# Patient Record
Sex: FEMALE | Race: BLACK OR AFRICAN AMERICAN | Employment: FULL TIME | URBAN - METROPOLITAN AREA
[De-identification: names, ages, dates, MRNs, and addresses within clinical notes are randomized per-mention and may not be internally consistent; named-entity substitution may affect disease eponyms.]

---

## 2018-04-03 ENCOUNTER — HOSPITAL ENCOUNTER (OUTPATIENT)
Age: 23
Discharge: HOME | End: 2018-04-03
Attending: LEGAL MEDICINE | Admitting: LEGAL MEDICINE
Payer: COMMERCIAL

## 2018-04-03 ENCOUNTER — APPOINTMENT (OUTPATIENT)
Dept: LABOR AND DELIVERY | Age: 23
End: 2018-04-03
Payer: COMMERCIAL

## 2018-04-03 VITALS
DIASTOLIC BLOOD PRESSURE: 70 MMHG | RESPIRATION RATE: 16 BRPM | HEART RATE: 107 BPM | SYSTOLIC BLOOD PRESSURE: 93 MMHG | TEMPERATURE: 98.7 F

## 2018-04-03 PROCEDURE — 59025 FETAL NON-STRESS TEST: CPT

## 2018-04-05 ENCOUNTER — APPOINTMENT (OUTPATIENT)
Dept: LABOR AND DELIVERY | Age: 23
End: 2018-04-05
Payer: COMMERCIAL

## 2018-04-05 ENCOUNTER — HOSPITAL ENCOUNTER (OUTPATIENT)
Age: 23
Discharge: HOME | End: 2018-04-05
Attending: LEGAL MEDICINE | Admitting: LEGAL MEDICINE
Payer: COMMERCIAL

## 2018-04-05 VITALS — HEART RATE: 100 BPM | SYSTOLIC BLOOD PRESSURE: 113 MMHG | RESPIRATION RATE: 16 BRPM | DIASTOLIC BLOOD PRESSURE: 63 MMHG

## 2018-04-05 PROCEDURE — 59025 FETAL NON-STRESS TEST: CPT

## 2018-04-11 ENCOUNTER — HOSPITAL ENCOUNTER (INPATIENT)
Age: 23
LOS: 2 days | Discharge: HOME | DRG: 560 | End: 2018-04-13
Attending: LEGAL MEDICINE | Admitting: LEGAL MEDICINE
Payer: COMMERCIAL

## 2018-04-11 ENCOUNTER — APPOINTMENT (OUTPATIENT)
Dept: LABOR AND DELIVERY | Age: 23
DRG: 560 | End: 2018-04-11
Payer: COMMERCIAL

## 2018-04-11 LAB
ABO/RH: NORMAL
AMPHETAMINE SCREEN, URINE: NOT DETECTED
ANTIBODY SCREEN: NORMAL
BARBITURATE SCREEN URINE: NOT DETECTED
BENZODIAZEPINE SCREEN, URINE: NOT DETECTED
CANNABINOID SCREEN URINE: NOT DETECTED
COCAINE METABOLITE SCREEN URINE: NOT DETECTED
HCT VFR BLD CALC: 32.5 % (ref 34–48)
HEMOGLOBIN: 10.8 G/DL (ref 11.5–15.5)
MCH RBC QN AUTO: 28.4 PG (ref 26–35)
MCHC RBC AUTO-ENTMCNC: 33.2 % (ref 32–34.5)
MCV RBC AUTO: 85.5 FL (ref 80–99.9)
METHADONE SCREEN, URINE: NOT DETECTED
OPIATE SCREEN URINE: NOT DETECTED
PDW BLD-RTO: 13.2 FL (ref 11.5–15)
PHENCYCLIDINE SCREEN URINE: NOT DETECTED
PLATELET # BLD: 314 E9/L (ref 130–450)
PMV BLD AUTO: 10.3 FL (ref 7–12)
PROPOXYPHENE SCREEN: NOT DETECTED
RBC # BLD: 3.8 E12/L (ref 3.5–5.5)
WBC # BLD: 8.4 E9/L (ref 4.5–11.5)

## 2018-04-11 PROCEDURE — 80307 DRUG TEST PRSMV CHEM ANLYZR: CPT

## 2018-04-11 PROCEDURE — 36415 COLL VENOUS BLD VENIPUNCTURE: CPT

## 2018-04-11 PROCEDURE — 6360000002 HC RX W HCPCS: Performed by: LEGAL MEDICINE

## 2018-04-11 PROCEDURE — 86901 BLOOD TYPING SEROLOGIC RH(D): CPT

## 2018-04-11 PROCEDURE — 2580000003 HC RX 258: Performed by: LEGAL MEDICINE

## 2018-04-11 PROCEDURE — 86900 BLOOD TYPING SEROLOGIC ABO: CPT

## 2018-04-11 PROCEDURE — 7200000001 HC VAGINAL DELIVERY

## 2018-04-11 PROCEDURE — 86850 RBC ANTIBODY SCREEN: CPT

## 2018-04-11 PROCEDURE — 1220000001 HC SEMI PRIVATE L&D R&B

## 2018-04-11 PROCEDURE — 85027 COMPLETE CBC AUTOMATED: CPT

## 2018-04-11 PROCEDURE — 88307 TISSUE EXAM BY PATHOLOGIST: CPT

## 2018-04-11 RX ORDER — LANOLIN 100 %
OINTMENT (GRAM) TOPICAL PRN
Status: DISCONTINUED | OUTPATIENT
Start: 2018-04-11 | End: 2018-04-13 | Stop reason: HOSPADM

## 2018-04-11 RX ORDER — DOCUSATE SODIUM 100 MG/1
100 CAPSULE, LIQUID FILLED ORAL 2 TIMES DAILY
Status: DISCONTINUED | OUTPATIENT
Start: 2018-04-12 | End: 2018-04-13 | Stop reason: HOSPADM

## 2018-04-11 RX ORDER — SODIUM CHLORIDE 0.9 % (FLUSH) 0.9 %
10 SYRINGE (ML) INJECTION PRN
Status: DISCONTINUED | OUTPATIENT
Start: 2018-04-11 | End: 2018-04-11

## 2018-04-11 RX ORDER — SODIUM CHLORIDE 0.9 % (FLUSH) 0.9 %
10 SYRINGE (ML) INJECTION EVERY 12 HOURS SCHEDULED
Status: DISCONTINUED | OUTPATIENT
Start: 2018-04-11 | End: 2018-04-11

## 2018-04-11 RX ORDER — CARBOPROST TROMETHAMINE 250 UG/ML
250 INJECTION, SOLUTION INTRAMUSCULAR
Status: ACTIVE | OUTPATIENT
Start: 2018-04-11 | End: 2018-04-11

## 2018-04-11 RX ORDER — METHYLERGONOVINE MALEATE 0.2 MG/ML
200 INJECTION INTRAVENOUS
Status: ACTIVE | OUTPATIENT
Start: 2018-04-11 | End: 2018-04-11

## 2018-04-11 RX ORDER — OXYCODONE HYDROCHLORIDE AND ACETAMINOPHEN 5; 325 MG/1; MG/1
1 TABLET ORAL EVERY 4 HOURS PRN
Status: DISCONTINUED | OUTPATIENT
Start: 2018-04-11 | End: 2018-04-13 | Stop reason: HOSPADM

## 2018-04-11 RX ORDER — SODIUM CHLORIDE 0.9 % (FLUSH) 0.9 %
10 SYRINGE (ML) INJECTION PRN
Status: DISCONTINUED | OUTPATIENT
Start: 2018-04-11 | End: 2018-04-13 | Stop reason: HOSPADM

## 2018-04-11 RX ORDER — EPHEDRINE SULFATE/0.9% NACL/PF 50 MG/5 ML
5 SYRINGE (ML) INTRAVENOUS PRN
Status: DISCONTINUED | OUTPATIENT
Start: 2018-04-11 | End: 2018-04-11

## 2018-04-11 RX ORDER — SODIUM CHLORIDE 0.9 % (FLUSH) 0.9 %
10 SYRINGE (ML) INJECTION EVERY 12 HOURS SCHEDULED
Status: DISCONTINUED | OUTPATIENT
Start: 2018-04-12 | End: 2018-04-13 | Stop reason: HOSPADM

## 2018-04-11 RX ORDER — SODIUM CHLORIDE, SODIUM LACTATE, POTASSIUM CHLORIDE, CALCIUM CHLORIDE 600; 310; 30; 20 MG/100ML; MG/100ML; MG/100ML; MG/100ML
INJECTION, SOLUTION INTRAVENOUS CONTINUOUS
Status: DISCONTINUED | OUTPATIENT
Start: 2018-04-11 | End: 2018-04-11

## 2018-04-11 RX ORDER — DEXTROSE, SODIUM CHLORIDE, SODIUM LACTATE, POTASSIUM CHLORIDE, AND CALCIUM CHLORIDE 5; .6; .31; .03; .02 G/100ML; G/100ML; G/100ML; G/100ML; G/100ML
INJECTION, SOLUTION INTRAVENOUS CONTINUOUS
Status: DISCONTINUED | OUTPATIENT
Start: 2018-04-11 | End: 2018-04-11

## 2018-04-11 RX ORDER — SODIUM CHLORIDE, SODIUM LACTATE, POTASSIUM CHLORIDE, CALCIUM CHLORIDE 600; 310; 30; 20 MG/100ML; MG/100ML; MG/100ML; MG/100ML
INJECTION, SOLUTION INTRAVENOUS CONTINUOUS
Status: DISCONTINUED | OUTPATIENT
Start: 2018-04-12 | End: 2018-04-13 | Stop reason: HOSPADM

## 2018-04-11 RX ORDER — OXYCODONE HYDROCHLORIDE AND ACETAMINOPHEN 5; 325 MG/1; MG/1
2 TABLET ORAL EVERY 4 HOURS PRN
Status: DISCONTINUED | OUTPATIENT
Start: 2018-04-11 | End: 2018-04-13 | Stop reason: HOSPADM

## 2018-04-11 RX ORDER — FERROUS SULFATE 325(65) MG
325 TABLET ORAL
Status: DISCONTINUED | OUTPATIENT
Start: 2018-04-12 | End: 2018-04-13 | Stop reason: HOSPADM

## 2018-04-11 RX ORDER — ONDANSETRON 2 MG/ML
4 INJECTION INTRAMUSCULAR; INTRAVENOUS EVERY 6 HOURS PRN
Status: DISCONTINUED | OUTPATIENT
Start: 2018-04-11 | End: 2018-04-11

## 2018-04-11 RX ORDER — LIDOCAINE HYDROCHLORIDE 10 MG/ML
30 INJECTION, SOLUTION EPIDURAL; INFILTRATION; INTRACAUDAL; PERINEURAL PRN
Status: DISCONTINUED | OUTPATIENT
Start: 2018-04-11 | End: 2018-04-11

## 2018-04-11 RX ORDER — ACETAMINOPHEN 325 MG/1
650 TABLET ORAL EVERY 4 HOURS PRN
Status: DISCONTINUED | OUTPATIENT
Start: 2018-04-11 | End: 2018-04-13 | Stop reason: HOSPADM

## 2018-04-11 RX ORDER — IBUPROFEN 600 MG/1
600 TABLET ORAL EVERY 8 HOURS PRN
Status: DISCONTINUED | OUTPATIENT
Start: 2018-04-11 | End: 2018-04-13 | Stop reason: HOSPADM

## 2018-04-11 RX ADMIN — Medication 2 MILLI-UNITS/MIN: at 17:00

## 2018-04-11 RX ADMIN — SODIUM CHLORIDE, POTASSIUM CHLORIDE, SODIUM LACTATE AND CALCIUM CHLORIDE 125 ML/HR: 600; 310; 30; 20 INJECTION, SOLUTION INTRAVENOUS at 16:00

## 2018-04-12 PROBLEM — Z37.9 NORMAL LABOR: Status: ACTIVE | Noted: 2018-04-12

## 2018-04-12 LAB — HEMOGLOBIN: 11.3 G/DL (ref 11.5–15.5)

## 2018-04-12 PROCEDURE — 85018 HEMOGLOBIN: CPT

## 2018-04-12 PROCEDURE — 36415 COLL VENOUS BLD VENIPUNCTURE: CPT

## 2018-04-12 PROCEDURE — 1220000001 HC SEMI PRIVATE L&D R&B

## 2018-04-12 PROCEDURE — 6370000000 HC RX 637 (ALT 250 FOR IP): Performed by: LEGAL MEDICINE

## 2018-04-12 RX ADMIN — OXYCODONE HYDROCHLORIDE AND ACETAMINOPHEN 2 TABLET: 5; 325 TABLET ORAL at 07:33

## 2018-04-12 RX ADMIN — OXYCODONE HYDROCHLORIDE AND ACETAMINOPHEN 2 TABLET: 5; 325 TABLET ORAL at 19:59

## 2018-04-12 RX ADMIN — OXYCODONE HYDROCHLORIDE AND ACETAMINOPHEN 2 TABLET: 5; 325 TABLET ORAL at 23:51

## 2018-04-12 RX ADMIN — DOCUSATE SODIUM 100 MG: 100 CAPSULE, LIQUID FILLED ORAL at 07:33

## 2018-04-12 RX ADMIN — OXYCODONE HYDROCHLORIDE AND ACETAMINOPHEN 2 TABLET: 5; 325 TABLET ORAL at 02:16

## 2018-04-12 RX ADMIN — OXYCODONE HYDROCHLORIDE AND ACETAMINOPHEN 2 TABLET: 5; 325 TABLET ORAL at 11:30

## 2018-04-12 RX ADMIN — OXYCODONE HYDROCHLORIDE AND ACETAMINOPHEN 2 TABLET: 5; 325 TABLET ORAL at 16:10

## 2018-04-12 RX ADMIN — IBUPROFEN 600 MG: 600 TABLET, FILM COATED ORAL at 00:07

## 2018-04-12 RX ADMIN — DOCUSATE SODIUM 100 MG: 100 CAPSULE, LIQUID FILLED ORAL at 20:07

## 2018-04-12 RX ADMIN — WITCH HAZEL 40 EACH: 500 SOLUTION RECTAL; TOPICAL at 07:34

## 2018-04-12 ASSESSMENT — PAIN DESCRIPTION - PAIN TYPE: TYPE: ACUTE PAIN

## 2018-04-12 ASSESSMENT — PAIN SCALES - GENERAL
PAINLEVEL_OUTOF10: 8
PAINLEVEL_OUTOF10: 8
PAINLEVEL_OUTOF10: 1
PAINLEVEL_OUTOF10: 9
PAINLEVEL_OUTOF10: 6
PAINLEVEL_OUTOF10: 7

## 2018-04-12 ASSESSMENT — PAIN DESCRIPTION - LOCATION: LOCATION: ABDOMEN;PERINEUM

## 2018-04-12 ASSESSMENT — PAIN DESCRIPTION - DESCRIPTORS: DESCRIPTORS: CRAMPING;DISCOMFORT

## 2018-04-13 ENCOUNTER — HOSPITAL ENCOUNTER (EMERGENCY)
Age: 23
Discharge: HOME | End: 2018-04-13
Attending: EMERGENCY MEDICINE
Payer: COMMERCIAL

## 2018-04-13 VITALS
RESPIRATION RATE: 16 BRPM | BODY MASS INDEX: 30.37 KG/M2 | HEIGHT: 66 IN | SYSTOLIC BLOOD PRESSURE: 113 MMHG | TEMPERATURE: 98.2 F | WEIGHT: 189 LBS | HEART RATE: 83 BPM | DIASTOLIC BLOOD PRESSURE: 56 MMHG

## 2018-04-13 VITALS
RESPIRATION RATE: 16 BRPM | OXYGEN SATURATION: 97 % | BODY MASS INDEX: 28.93 KG/M2 | HEART RATE: 99 BPM | DIASTOLIC BLOOD PRESSURE: 76 MMHG | SYSTOLIC BLOOD PRESSURE: 131 MMHG | WEIGHT: 180 LBS | HEIGHT: 66 IN | TEMPERATURE: 98.7 F

## 2018-04-13 DIAGNOSIS — Z71.1 NO PROBLEM, FEARED COMPLAINT UNFOUNDED: Primary | ICD-10-CM

## 2018-04-13 PROCEDURE — 99283 EMERGENCY DEPT VISIT LOW MDM: CPT

## 2018-04-13 PROCEDURE — 6370000000 HC RX 637 (ALT 250 FOR IP): Performed by: LEGAL MEDICINE

## 2018-04-13 RX ADMIN — OXYCODONE HYDROCHLORIDE AND ACETAMINOPHEN 2 TABLET: 5; 325 TABLET ORAL at 05:18

## 2018-04-13 RX ADMIN — IBUPROFEN 600 MG: 600 TABLET, FILM COATED ORAL at 05:54

## 2018-04-13 RX ADMIN — DOCUSATE SODIUM 100 MG: 100 CAPSULE, LIQUID FILLED ORAL at 08:43

## 2018-04-13 RX ADMIN — OXYCODONE HYDROCHLORIDE AND ACETAMINOPHEN 2 TABLET: 5; 325 TABLET ORAL at 09:21

## 2018-04-13 ASSESSMENT — PAIN SCALES - GENERAL
PAINLEVEL_OUTOF10: 8
PAINLEVEL_OUTOF10: 7
PAINLEVEL_OUTOF10: 2

## 2018-05-10 ENCOUNTER — HOSPITAL ENCOUNTER (EMERGENCY)
Age: 23
Discharge: HOME | End: 2018-05-10
Attending: EMERGENCY MEDICINE
Payer: COMMERCIAL

## 2018-05-10 VITALS
HEART RATE: 83 BPM | RESPIRATION RATE: 16 BRPM | TEMPERATURE: 97.7 F | WEIGHT: 170 LBS | OXYGEN SATURATION: 99 % | BODY MASS INDEX: 27.44 KG/M2 | DIASTOLIC BLOOD PRESSURE: 105 MMHG | SYSTOLIC BLOOD PRESSURE: 136 MMHG

## 2018-05-10 DIAGNOSIS — J30.1 ACUTE SEASONAL ALLERGIC RHINITIS DUE TO POLLEN: Primary | ICD-10-CM

## 2018-05-10 PROCEDURE — 99282 EMERGENCY DEPT VISIT SF MDM: CPT

## 2018-05-10 RX ORDER — FEXOFENADINE HYDROCHLORIDE 60 MG/1
60 TABLET, FILM COATED ORAL DAILY
Qty: 24 TABLET | Refills: 0 | Status: SHIPPED | OUTPATIENT
Start: 2018-05-10 | End: 2018-06-03

## 2018-05-10 ASSESSMENT — ENCOUNTER SYMPTOMS
RHINORRHEA: 1
EYE ITCHING: 1
COUGH: 1
GASTROINTESTINAL NEGATIVE: 1

## 2018-05-16 ENCOUNTER — HOSPITAL ENCOUNTER (OUTPATIENT)
Dept: ULTRASOUND IMAGING | Age: 23
Discharge: HOME | End: 2018-05-16
Payer: COMMERCIAL

## 2018-05-16 PROCEDURE — 76856 US EXAM PELVIC COMPLETE: CPT

## 2019-03-19 ENCOUNTER — HOSPITAL ENCOUNTER (EMERGENCY)
Age: 24
Discharge: HOME | End: 2019-03-19
Attending: EMERGENCY MEDICINE
Payer: COMMERCIAL

## 2019-03-19 VITALS
WEIGHT: 185 LBS | HEART RATE: 88 BPM | RESPIRATION RATE: 16 BRPM | SYSTOLIC BLOOD PRESSURE: 111 MMHG | DIASTOLIC BLOOD PRESSURE: 77 MMHG | TEMPERATURE: 98.2 F | HEIGHT: 66 IN | OXYGEN SATURATION: 99 % | BODY MASS INDEX: 29.73 KG/M2

## 2019-03-19 DIAGNOSIS — H00.014 HORDEOLUM EXTERNUM OF LEFT UPPER EYELID: Primary | ICD-10-CM

## 2019-03-19 PROCEDURE — 99283 EMERGENCY DEPT VISIT LOW MDM: CPT

## 2019-03-19 RX ORDER — IBUPROFEN 600 MG/1
600 TABLET ORAL EVERY 6 HOURS PRN
COMMUNITY
End: 2019-05-08 | Stop reason: ALTCHOICE

## 2019-03-19 RX ORDER — TOBRAMYCIN 3 MG/ML
1 SOLUTION/ DROPS OPHTHALMIC EVERY 6 HOURS
Qty: 1 BOTTLE | Refills: 0 | Status: SHIPPED | OUTPATIENT
Start: 2019-03-19 | End: 2019-03-29

## 2019-03-19 ASSESSMENT — ENCOUNTER SYMPTOMS
DIARRHEA: 0
COUGH: 0
ABDOMINAL DISTENTION: 0
NAUSEA: 0
SHORTNESS OF BREATH: 0
BACK PAIN: 0
PERI-ORBITAL EDEMA: 1
VOMITING: 0
SORE THROAT: 0
WHEEZING: 0
EYE PAIN: 0
SINUS PRESSURE: 0
EYE DISCHARGE: 0
EYE REDNESS: 1

## 2019-03-19 ASSESSMENT — PAIN DESCRIPTION - LOCATION: LOCATION: EYE

## 2019-03-19 ASSESSMENT — PAIN DESCRIPTION - PROGRESSION: CLINICAL_PROGRESSION: NOT CHANGED

## 2019-03-19 ASSESSMENT — PAIN SCALES - GENERAL: PAINLEVEL_OUTOF10: 8

## 2019-03-19 ASSESSMENT — PAIN DESCRIPTION - PAIN TYPE: TYPE: ACUTE PAIN

## 2019-03-19 ASSESSMENT — PAIN DESCRIPTION - ORIENTATION: ORIENTATION: LEFT

## 2019-03-19 ASSESSMENT — PAIN DESCRIPTION - FREQUENCY: FREQUENCY: CONTINUOUS

## 2019-03-19 ASSESSMENT — PAIN DESCRIPTION - ONSET: ONSET: ON-GOING

## 2019-03-19 ASSESSMENT — PAIN DESCRIPTION - DESCRIPTORS: DESCRIPTORS: ACHING

## 2019-03-20 ENCOUNTER — HOSPITAL ENCOUNTER (EMERGENCY)
Age: 24
Discharge: HOME | End: 2019-03-20
Payer: COMMERCIAL

## 2019-03-20 VITALS
HEART RATE: 66 BPM | BODY MASS INDEX: 29.73 KG/M2 | WEIGHT: 185 LBS | TEMPERATURE: 98 F | OXYGEN SATURATION: 95 % | HEIGHT: 66 IN | RESPIRATION RATE: 18 BRPM | DIASTOLIC BLOOD PRESSURE: 75 MMHG | SYSTOLIC BLOOD PRESSURE: 134 MMHG

## 2019-03-20 DIAGNOSIS — H00.014 HORDEOLUM EXTERNUM OF LEFT UPPER EYELID: Primary | ICD-10-CM

## 2019-03-20 PROCEDURE — 99282 EMERGENCY DEPT VISIT SF MDM: CPT

## 2019-03-20 ASSESSMENT — PAIN SCALES - GENERAL: PAINLEVEL_OUTOF10: 9

## 2019-05-08 ENCOUNTER — HOSPITAL ENCOUNTER (EMERGENCY)
Age: 24
Discharge: HOME | End: 2019-05-08
Attending: EMERGENCY MEDICINE
Payer: COMMERCIAL

## 2019-05-08 VITALS
WEIGHT: 204 LBS | SYSTOLIC BLOOD PRESSURE: 106 MMHG | OXYGEN SATURATION: 97 % | RESPIRATION RATE: 20 BRPM | BODY MASS INDEX: 32.93 KG/M2 | HEART RATE: 100 BPM | DIASTOLIC BLOOD PRESSURE: 72 MMHG | TEMPERATURE: 98.2 F

## 2019-05-08 DIAGNOSIS — J30.89 ENVIRONMENTAL AND SEASONAL ALLERGIES: Primary | ICD-10-CM

## 2019-05-08 PROCEDURE — 6360000002 HC RX W HCPCS: Performed by: EMERGENCY MEDICINE

## 2019-05-08 PROCEDURE — 96372 THER/PROPH/DIAG INJ SC/IM: CPT

## 2019-05-08 PROCEDURE — 99283 EMERGENCY DEPT VISIT LOW MDM: CPT

## 2019-05-08 RX ORDER — CETIRIZINE HYDROCHLORIDE 10 MG/1
TABLET ORAL
Qty: 30 TABLET | Refills: 1 | Status: SHIPPED | OUTPATIENT
Start: 2019-05-08 | End: 2019-10-24

## 2019-05-08 RX ORDER — TRIAMCINOLONE ACETONIDE 40 MG/ML
40 INJECTION, SUSPENSION INTRA-ARTICULAR; INTRAMUSCULAR ONCE
Status: COMPLETED | OUTPATIENT
Start: 2019-05-08 | End: 2019-05-08

## 2019-05-08 RX ADMIN — TRIAMCINOLONE ACETONIDE 40 MG: 40 INJECTION, SUSPENSION INTRA-ARTICULAR; INTRAMUSCULAR at 18:38

## 2019-05-09 ASSESSMENT — ENCOUNTER SYMPTOMS
WHEEZING: 0
EYE PAIN: 0
CONSTIPATION: 0
DIARRHEA: 0
EYE REDNESS: 1
EYE ITCHING: 1
RHINORRHEA: 1
NAUSEA: 0
SINUS PRESSURE: 0
VOMITING: 0
ABDOMINAL PAIN: 0
SORE THROAT: 0
SHORTNESS OF BREATH: 0
BACK PAIN: 0
COUGH: 0

## 2019-05-09 NOTE — ED PROVIDER NOTES
Chief Complaint   Patient presents with    Allergic Reaction     \"allergies\", eyes, ears, nose itching. Doesn't want pills, \"I forget to take them\"   : had concerns including Allergic Reaction (\"allergies\", eyes, ears, nose itching. Doesn't want pills, \"I forget to take them\"). Patient presenting to minor ER with the above-noted history. Appreciate that she forgets to take allergy pills but informed her that no long-acting injections available. We'll provide her with a steroid injection today as well as a prescription for an oral antihistamine. Review of Systems   Constitutional: Negative for chills and fever. HENT: Positive for rhinorrhea. Negative for congestion, ear pain, sinus pressure and sore throat. Eyes: Positive for redness and itching. Negative for pain. Respiratory: Negative for cough, shortness of breath and wheezing. Cardiovascular: Negative for chest pain. Gastrointestinal: Negative for abdominal pain, constipation, diarrhea, nausea and vomiting. Genitourinary: Negative for dysuria, frequency and urgency. Musculoskeletal: Negative for arthralgias and back pain. Skin: Negative for rash and wound. Neurological: Negative for weakness and headaches. Hematological: Negative for adenopathy. All other systems reviewed and are negative. Physical Exam   Constitutional: She is oriented to person, place, and time. She appears well-developed and well-nourished. HENT:   Head: Normocephalic and atraumatic. Right Ear: Tympanic membrane and external ear normal.   Left Ear: Tympanic membrane and external ear normal.   Nose: Rhinorrhea present. Mouth/Throat: Uvula is midline and oropharynx is clear and moist.   Eyes: Pupils are equal, round, and reactive to light. Conjunctivae and EOM are normal. Right eye exhibits discharge. Left eye exhibits discharge. Neck: Normal range of motion. Neck supple. Cardiovascular: Normal rate, regular rhythm and normal heart sounds. No murmur heard. Pulmonary/Chest: Effort normal and breath sounds normal.   Abdominal: Soft. Bowel sounds are normal. There is no tenderness. There is no rebound and no guarding. Musculoskeletal: She exhibits no edema. Neurological: She is alert and oriented to person, place, and time. Skin: Skin is warm and dry. Nursing note and vitals reviewed. Procedures    [facility]            --------------------------------------------- PAST HISTORY ---------------------------------------------  Past Medical History:  has a past medical history of Eczema and Environmental allergies. Past Surgical History:  has no past surgical history on file. Social History:  reports that she is a non-smoker but has been exposed to tobacco smoke. She has never used smokeless tobacco. She reports that she does not drink alcohol or use drugs. Family History: family history is not on file. The patients home medications have been reviewed. Allergies: Patient has no known allergies. -------------------------------------------------- RESULTS -------------------------------------------------  No results found for this visit on 05/08/19. No orders to display       ------------------------- NURSING NOTES AND VITALS REVIEWED ---------------------------   The nursing notes within the ED encounter and vital signs as below have been reviewed. /72   Pulse 100   Temp 98.2 °F (36.8 °C) (Oral)   Resp 20   Wt 204 lb (92.5 kg)   LMP 04/26/2019   SpO2 97%   BMI 32.93 kg/m²   Oxygen Saturation Interpretation: Normal      ------------------------------------------ PROGRESS NOTES ------------------------------------------   I have spoken with the patient and discussed todays results, in addition to providing specific details for the plan of care and counseling regarding the diagnosis and prognosis. Their questions are answered at this time and they are agreeable with the plan.     Discharge diagnoses: Environmental and seasonal allergies. --------------------------------- ADDITIONAL PROVIDER NOTES ---------------------------------     This patient is stable for discharge. I have shared the specific conditions for return, as well as the importance of follow-up.             Felix Vitale DO  05/09/19 0315

## 2019-10-07 ENCOUNTER — HOSPITAL ENCOUNTER (OUTPATIENT)
Age: 24
Discharge: HOME | End: 2019-10-07
Payer: COMMERCIAL

## 2019-10-07 LAB — GONADOTROPIN, CHORIONIC (HCG) QUANT: ABNORMAL MIU/ML

## 2019-10-07 PROCEDURE — 36415 COLL VENOUS BLD VENIPUNCTURE: CPT

## 2019-10-07 PROCEDURE — 84702 CHORIONIC GONADOTROPIN TEST: CPT

## 2019-10-09 ENCOUNTER — HOSPITAL ENCOUNTER (OUTPATIENT)
Age: 24
Discharge: HOME | End: 2019-10-09
Payer: COMMERCIAL

## 2019-10-09 LAB — GONADOTROPIN, CHORIONIC (HCG) QUANT: ABNORMAL MIU/ML

## 2019-10-09 PROCEDURE — 36415 COLL VENOUS BLD VENIPUNCTURE: CPT

## 2019-10-09 PROCEDURE — 84702 CHORIONIC GONADOTROPIN TEST: CPT

## 2019-10-22 ENCOUNTER — HOSPITAL ENCOUNTER (OUTPATIENT)
Age: 24
Discharge: HOME | End: 2019-10-22
Payer: COMMERCIAL

## 2019-10-22 LAB — GONADOTROPIN, CHORIONIC (HCG) QUANT: ABNORMAL MIU/ML

## 2019-10-22 PROCEDURE — 84702 CHORIONIC GONADOTROPIN TEST: CPT

## 2019-10-22 PROCEDURE — 36415 COLL VENOUS BLD VENIPUNCTURE: CPT

## 2019-10-23 ENCOUNTER — HOSPITAL ENCOUNTER (OUTPATIENT)
Age: 24
Discharge: HOME | End: 2019-10-23
Payer: COMMERCIAL

## 2019-10-23 LAB — GONADOTROPIN, CHORIONIC (HCG) QUANT: ABNORMAL MIU/ML

## 2019-10-23 PROCEDURE — 36415 COLL VENOUS BLD VENIPUNCTURE: CPT

## 2019-10-23 PROCEDURE — 84702 CHORIONIC GONADOTROPIN TEST: CPT

## 2019-10-24 PROBLEM — O02.1 MISSED ABORTION: Status: ACTIVE | Noted: 2019-10-24

## 2019-10-25 ENCOUNTER — ANESTHESIA EVENT (OUTPATIENT)
Dept: OPERATING ROOM | Age: 24
End: 2019-10-25
Payer: COMMERCIAL

## 2019-10-25 ENCOUNTER — HOSPITAL ENCOUNTER (OUTPATIENT)
Age: 24
Setting detail: OUTPATIENT SURGERY
Discharge: HOME | End: 2019-10-25
Attending: OBSTETRICS & GYNECOLOGY | Admitting: OBSTETRICS & GYNECOLOGY
Payer: COMMERCIAL

## 2019-10-25 ENCOUNTER — ANESTHESIA (OUTPATIENT)
Dept: OPERATING ROOM | Age: 24
End: 2019-10-25
Payer: COMMERCIAL

## 2019-10-25 VITALS
DIASTOLIC BLOOD PRESSURE: 55 MMHG | SYSTOLIC BLOOD PRESSURE: 108 MMHG | HEIGHT: 67 IN | HEART RATE: 90 BPM | RESPIRATION RATE: 16 BRPM | OXYGEN SATURATION: 99 % | WEIGHT: 204 LBS | BODY MASS INDEX: 32.02 KG/M2 | TEMPERATURE: 96.8 F

## 2019-10-25 VITALS
SYSTOLIC BLOOD PRESSURE: 89 MMHG | DIASTOLIC BLOOD PRESSURE: 51 MMHG | OXYGEN SATURATION: 100 % | RESPIRATION RATE: 22 BRPM

## 2019-10-25 DIAGNOSIS — O02.1 MISSED ABORTION: Primary | ICD-10-CM

## 2019-10-25 LAB
ABO/RH: NORMAL
ANTIBODY SCREEN: NORMAL
BASOPHILS ABSOLUTE: 0.03 E9/L (ref 0–0.2)
BASOPHILS RELATIVE PERCENT: 0.4 % (ref 0–2)
EOSINOPHILS ABSOLUTE: 0.13 E9/L (ref 0.05–0.5)
EOSINOPHILS RELATIVE PERCENT: 1.8 % (ref 0–6)
HCT VFR BLD CALC: 42.7 % (ref 34–48)
HEMOGLOBIN: 14 G/DL (ref 11.5–15.5)
IMMATURE GRANULOCYTES #: 0.02 E9/L
IMMATURE GRANULOCYTES %: 0.3 % (ref 0–5)
LYMPHOCYTES ABSOLUTE: 2.43 E9/L (ref 1.5–4)
LYMPHOCYTES RELATIVE PERCENT: 33.8 % (ref 20–42)
MCH RBC QN AUTO: 29.4 PG (ref 26–35)
MCHC RBC AUTO-ENTMCNC: 32.8 % (ref 32–34.5)
MCV RBC AUTO: 89.7 FL (ref 80–99.9)
MONOCYTES ABSOLUTE: 0.53 E9/L (ref 0.1–0.95)
MONOCYTES RELATIVE PERCENT: 7.4 % (ref 2–12)
NEUTROPHILS ABSOLUTE: 4.04 E9/L (ref 1.8–7.3)
NEUTROPHILS RELATIVE PERCENT: 56.3 % (ref 43–80)
PDW BLD-RTO: 12.4 FL (ref 11.5–15)
PLATELET # BLD: 381 E9/L (ref 130–450)
PMV BLD AUTO: 9.8 FL (ref 7–12)
RBC # BLD: 4.76 E12/L (ref 3.5–5.5)
WBC # BLD: 7.2 E9/L (ref 4.5–11.5)

## 2019-10-25 PROCEDURE — 6360000002 HC RX W HCPCS

## 2019-10-25 PROCEDURE — 3600000002 HC SURGERY LEVEL 2 BASE: Performed by: OBSTETRICS & GYNECOLOGY

## 2019-10-25 PROCEDURE — 85025 COMPLETE CBC W/AUTO DIFF WBC: CPT

## 2019-10-25 PROCEDURE — 2580000003 HC RX 258

## 2019-10-25 PROCEDURE — 36415 COLL VENOUS BLD VENIPUNCTURE: CPT

## 2019-10-25 PROCEDURE — 3700000000 HC ANESTHESIA ATTENDED CARE: Performed by: OBSTETRICS & GYNECOLOGY

## 2019-10-25 PROCEDURE — 86901 BLOOD TYPING SEROLOGIC RH(D): CPT

## 2019-10-25 PROCEDURE — 88305 TISSUE EXAM BY PATHOLOGIST: CPT

## 2019-10-25 PROCEDURE — 3600000012 HC SURGERY LEVEL 2 ADDTL 15MIN: Performed by: OBSTETRICS & GYNECOLOGY

## 2019-10-25 PROCEDURE — 7100000010 HC PHASE II RECOVERY - FIRST 15 MIN: Performed by: OBSTETRICS & GYNECOLOGY

## 2019-10-25 PROCEDURE — 7100000001 HC PACU RECOVERY - ADDTL 15 MIN: Performed by: OBSTETRICS & GYNECOLOGY

## 2019-10-25 PROCEDURE — 3700000001 HC ADD 15 MINUTES (ANESTHESIA): Performed by: OBSTETRICS & GYNECOLOGY

## 2019-10-25 PROCEDURE — 2580000003 HC RX 258: Performed by: OBSTETRICS & GYNECOLOGY

## 2019-10-25 PROCEDURE — 2709999900 HC NON-CHARGEABLE SUPPLY: Performed by: OBSTETRICS & GYNECOLOGY

## 2019-10-25 PROCEDURE — 7100000011 HC PHASE II RECOVERY - ADDTL 15 MIN: Performed by: OBSTETRICS & GYNECOLOGY

## 2019-10-25 PROCEDURE — 86900 BLOOD TYPING SEROLOGIC ABO: CPT

## 2019-10-25 PROCEDURE — 7100000000 HC PACU RECOVERY - FIRST 15 MIN: Performed by: OBSTETRICS & GYNECOLOGY

## 2019-10-25 PROCEDURE — 86850 RBC ANTIBODY SCREEN: CPT

## 2019-10-25 RX ORDER — ONDANSETRON 2 MG/ML
4 INJECTION INTRAMUSCULAR; INTRAVENOUS
Status: DISCONTINUED | OUTPATIENT
Start: 2019-10-25 | End: 2019-10-25 | Stop reason: HOSPADM

## 2019-10-25 RX ORDER — SODIUM CHLORIDE 0.9 % (FLUSH) 0.9 %
10 SYRINGE (ML) INJECTION EVERY 12 HOURS SCHEDULED
Status: DISCONTINUED | OUTPATIENT
Start: 2019-10-25 | End: 2019-10-25 | Stop reason: HOSPADM

## 2019-10-25 RX ORDER — SODIUM CHLORIDE 0.9 % (FLUSH) 0.9 %
10 SYRINGE (ML) INJECTION EVERY 12 HOURS SCHEDULED
Status: DISCONTINUED | OUTPATIENT
Start: 2019-10-25 | End: 2019-10-25 | Stop reason: SDUPTHER

## 2019-10-25 RX ORDER — ACETAMINOPHEN 325 MG/1
650 TABLET ORAL EVERY 4 HOURS PRN
Status: DISCONTINUED | OUTPATIENT
Start: 2019-10-25 | End: 2019-10-25 | Stop reason: HOSPADM

## 2019-10-25 RX ORDER — PROPOFOL 10 MG/ML
INJECTION, EMULSION INTRAVENOUS PRN
Status: DISCONTINUED | OUTPATIENT
Start: 2019-10-25 | End: 2019-10-25 | Stop reason: SDUPTHER

## 2019-10-25 RX ORDER — HYDROMORPHONE HYDROCHLORIDE 1 MG/ML
0.25 INJECTION, SOLUTION INTRAMUSCULAR; INTRAVENOUS; SUBCUTANEOUS EVERY 5 MIN PRN
Status: DISCONTINUED | OUTPATIENT
Start: 2019-10-25 | End: 2019-10-25 | Stop reason: HOSPADM

## 2019-10-25 RX ORDER — ONDANSETRON 2 MG/ML
4 INJECTION INTRAMUSCULAR; INTRAVENOUS EVERY 6 HOURS PRN
Status: DISCONTINUED | OUTPATIENT
Start: 2019-10-25 | End: 2019-10-25 | Stop reason: HOSPADM

## 2019-10-25 RX ORDER — OXYCODONE HYDROCHLORIDE AND ACETAMINOPHEN 5; 325 MG/1; MG/1
2 TABLET ORAL EVERY 4 HOURS PRN
Status: DISCONTINUED | OUTPATIENT
Start: 2019-10-25 | End: 2019-10-25 | Stop reason: HOSPADM

## 2019-10-25 RX ORDER — LABETALOL 20 MG/4 ML (5 MG/ML) INTRAVENOUS SYRINGE
5 EVERY 10 MIN PRN
Status: DISCONTINUED | OUTPATIENT
Start: 2019-10-25 | End: 2019-10-25 | Stop reason: HOSPADM

## 2019-10-25 RX ORDER — MEPERIDINE HYDROCHLORIDE 25 MG/ML
12.5 INJECTION INTRAMUSCULAR; INTRAVENOUS; SUBCUTANEOUS EVERY 5 MIN PRN
Status: DISCONTINUED | OUTPATIENT
Start: 2019-10-25 | End: 2019-10-25 | Stop reason: HOSPADM

## 2019-10-25 RX ORDER — LIDOCAINE HYDROCHLORIDE 20 MG/ML
INJECTION, SOLUTION INTRAVENOUS PRN
Status: DISCONTINUED | OUTPATIENT
Start: 2019-10-25 | End: 2019-10-25 | Stop reason: SDUPTHER

## 2019-10-25 RX ORDER — SODIUM CHLORIDE, SODIUM LACTATE, POTASSIUM CHLORIDE, CALCIUM CHLORIDE 600; 310; 30; 20 MG/100ML; MG/100ML; MG/100ML; MG/100ML
INJECTION, SOLUTION INTRAVENOUS CONTINUOUS
Status: DISCONTINUED | OUTPATIENT
Start: 2019-10-25 | End: 2019-10-25 | Stop reason: HOSPADM

## 2019-10-25 RX ORDER — DEXAMETHASONE SODIUM PHOSPHATE 10 MG/ML
INJECTION, SOLUTION INTRAMUSCULAR; INTRAVENOUS PRN
Status: DISCONTINUED | OUTPATIENT
Start: 2019-10-25 | End: 2019-10-25 | Stop reason: SDUPTHER

## 2019-10-25 RX ORDER — SODIUM CHLORIDE, SODIUM LACTATE, POTASSIUM CHLORIDE, CALCIUM CHLORIDE 600; 310; 30; 20 MG/100ML; MG/100ML; MG/100ML; MG/100ML
INJECTION, SOLUTION INTRAVENOUS CONTINUOUS PRN
Status: DISCONTINUED | OUTPATIENT
Start: 2019-10-25 | End: 2019-10-25 | Stop reason: SDUPTHER

## 2019-10-25 RX ORDER — PROMETHAZINE HYDROCHLORIDE 25 MG/ML
6.25 INJECTION, SOLUTION INTRAMUSCULAR; INTRAVENOUS
Status: DISCONTINUED | OUTPATIENT
Start: 2019-10-25 | End: 2019-10-25 | Stop reason: HOSPADM

## 2019-10-25 RX ORDER — ONDANSETRON 2 MG/ML
INJECTION INTRAMUSCULAR; INTRAVENOUS PRN
Status: DISCONTINUED | OUTPATIENT
Start: 2019-10-25 | End: 2019-10-25 | Stop reason: SDUPTHER

## 2019-10-25 RX ORDER — KETOROLAC TROMETHAMINE 30 MG/ML
30 INJECTION, SOLUTION INTRAMUSCULAR; INTRAVENOUS EVERY 6 HOURS
Status: DISCONTINUED | OUTPATIENT
Start: 2019-10-25 | End: 2019-10-25 | Stop reason: HOSPADM

## 2019-10-25 RX ORDER — SODIUM CHLORIDE, SODIUM LACTATE, POTASSIUM CHLORIDE, CALCIUM CHLORIDE 600; 310; 30; 20 MG/100ML; MG/100ML; MG/100ML; MG/100ML
INJECTION, SOLUTION INTRAVENOUS CONTINUOUS
Status: DISCONTINUED | OUTPATIENT
Start: 2019-10-25 | End: 2019-10-25 | Stop reason: SDUPTHER

## 2019-10-25 RX ORDER — DOCUSATE SODIUM 100 MG/1
100 CAPSULE, LIQUID FILLED ORAL 2 TIMES DAILY
Status: DISCONTINUED | OUTPATIENT
Start: 2019-10-25 | End: 2019-10-25 | Stop reason: HOSPADM

## 2019-10-25 RX ORDER — MIDAZOLAM HYDROCHLORIDE 1 MG/ML
INJECTION INTRAMUSCULAR; INTRAVENOUS PRN
Status: DISCONTINUED | OUTPATIENT
Start: 2019-10-25 | End: 2019-10-25 | Stop reason: SDUPTHER

## 2019-10-25 RX ORDER — IBUPROFEN 600 MG/1
600 TABLET ORAL EVERY 6 HOURS PRN
Status: DISCONTINUED | OUTPATIENT
Start: 2019-10-25 | End: 2019-10-25 | Stop reason: HOSPADM

## 2019-10-25 RX ORDER — SODIUM CHLORIDE 0.9 % (FLUSH) 0.9 %
10 SYRINGE (ML) INJECTION PRN
Status: DISCONTINUED | OUTPATIENT
Start: 2019-10-25 | End: 2019-10-25 | Stop reason: SDUPTHER

## 2019-10-25 RX ORDER — HYDROMORPHONE HYDROCHLORIDE 1 MG/ML
0.5 INJECTION, SOLUTION INTRAMUSCULAR; INTRAVENOUS; SUBCUTANEOUS EVERY 5 MIN PRN
Status: DISCONTINUED | OUTPATIENT
Start: 2019-10-25 | End: 2019-10-25 | Stop reason: HOSPADM

## 2019-10-25 RX ORDER — SODIUM CHLORIDE 0.9 % (FLUSH) 0.9 %
10 SYRINGE (ML) INJECTION PRN
Status: DISCONTINUED | OUTPATIENT
Start: 2019-10-25 | End: 2019-10-25 | Stop reason: HOSPADM

## 2019-10-25 RX ORDER — KETOROLAC TROMETHAMINE 30 MG/ML
INJECTION, SOLUTION INTRAMUSCULAR; INTRAVENOUS PRN
Status: DISCONTINUED | OUTPATIENT
Start: 2019-10-25 | End: 2019-10-25 | Stop reason: SDUPTHER

## 2019-10-25 RX ORDER — FENTANYL CITRATE 50 UG/ML
INJECTION, SOLUTION INTRAMUSCULAR; INTRAVENOUS PRN
Status: DISCONTINUED | OUTPATIENT
Start: 2019-10-25 | End: 2019-10-25 | Stop reason: SDUPTHER

## 2019-10-25 RX ORDER — OXYCODONE HYDROCHLORIDE AND ACETAMINOPHEN 5; 325 MG/1; MG/1
1 TABLET ORAL EVERY 4 HOURS PRN
Status: DISCONTINUED | OUTPATIENT
Start: 2019-10-25 | End: 2019-10-25 | Stop reason: HOSPADM

## 2019-10-25 RX ADMIN — SODIUM CHLORIDE, POTASSIUM CHLORIDE, SODIUM LACTATE AND CALCIUM CHLORIDE: 600; 310; 30; 20 INJECTION, SOLUTION INTRAVENOUS at 07:37

## 2019-10-25 RX ADMIN — SODIUM CHLORIDE, POTASSIUM CHLORIDE, SODIUM LACTATE AND CALCIUM CHLORIDE: 600; 310; 30; 20 INJECTION, SOLUTION INTRAVENOUS at 08:31

## 2019-10-25 RX ADMIN — MIDAZOLAM 2 MG: 1 INJECTION INTRAMUSCULAR; INTRAVENOUS at 08:31

## 2019-10-25 RX ADMIN — FENTANYL CITRATE 25 MCG: 50 INJECTION, SOLUTION INTRAMUSCULAR; INTRAVENOUS at 08:46

## 2019-10-25 RX ADMIN — FENTANYL CITRATE 50 MCG: 50 INJECTION, SOLUTION INTRAMUSCULAR; INTRAVENOUS at 08:37

## 2019-10-25 RX ADMIN — FENTANYL CITRATE 25 MCG: 50 INJECTION, SOLUTION INTRAMUSCULAR; INTRAVENOUS at 08:45

## 2019-10-25 RX ADMIN — PROPOFOL 200 MG: 10 INJECTION, EMULSION INTRAVENOUS at 08:37

## 2019-10-25 RX ADMIN — DEXAMETHASONE SODIUM PHOSPHATE 10 MG: 10 INJECTION, SOLUTION INTRAMUSCULAR; INTRAVENOUS at 08:37

## 2019-10-25 RX ADMIN — KETOROLAC TROMETHAMINE 30 MG: 30 INJECTION, SOLUTION INTRAMUSCULAR; INTRAVENOUS at 08:46

## 2019-10-25 RX ADMIN — ONDANSETRON HYDROCHLORIDE 4 MG: 2 INJECTION, SOLUTION INTRAMUSCULAR; INTRAVENOUS at 08:37

## 2019-10-25 RX ADMIN — Medication 200 ML/HR: at 08:44

## 2019-10-25 RX ADMIN — LIDOCAINE HYDROCHLORIDE 100 MG: 20 INJECTION, SOLUTION INTRAVENOUS at 08:37

## 2019-10-25 ASSESSMENT — PULMONARY FUNCTION TESTS
PIF_VALUE: 4
PIF_VALUE: 1
PIF_VALUE: 6
PIF_VALUE: 1
PIF_VALUE: 11
PIF_VALUE: 20
PIF_VALUE: 2
PIF_VALUE: 1
PIF_VALUE: 2
PIF_VALUE: 1
PIF_VALUE: 10
PIF_VALUE: 6
PIF_VALUE: 16
PIF_VALUE: 11
PIF_VALUE: 15
PIF_VALUE: 18

## 2019-10-25 ASSESSMENT — PAIN SCALES - GENERAL
PAINLEVEL_OUTOF10: 0

## 2019-10-25 ASSESSMENT — PAIN - FUNCTIONAL ASSESSMENT: PAIN_FUNCTIONAL_ASSESSMENT: 0-10

## 2020-01-28 ENCOUNTER — HOSPITAL ENCOUNTER (EMERGENCY)
Age: 25
Discharge: HOME | End: 2020-01-28
Attending: EMERGENCY MEDICINE
Payer: COMMERCIAL

## 2020-01-28 VITALS
DIASTOLIC BLOOD PRESSURE: 75 MMHG | SYSTOLIC BLOOD PRESSURE: 120 MMHG | OXYGEN SATURATION: 98 % | RESPIRATION RATE: 16 BRPM | BODY MASS INDEX: 33.43 KG/M2 | TEMPERATURE: 98 F | HEIGHT: 66 IN | WEIGHT: 208 LBS | HEART RATE: 98 BPM

## 2020-01-28 PROCEDURE — G0381 LEV 2 HOSP TYPE B ED VISIT: HCPCS

## 2020-01-28 RX ORDER — METHYLPREDNISOLONE SODIUM SUCCINATE 125 MG/2ML
80 INJECTION, POWDER, LYOPHILIZED, FOR SOLUTION INTRAMUSCULAR; INTRAVENOUS ONCE
Status: DISCONTINUED | OUTPATIENT
Start: 2020-01-28 | End: 2020-01-28 | Stop reason: HOSPADM

## 2020-01-28 NOTE — ED PROVIDER NOTES
(36.7 °C) (Oral)   Resp 16   Ht 5' 6\" (1.676 m)   Wt 208 lb (94.3 kg)   LMP 01/14/2020   SpO2 98%   Breastfeeding Unknown   BMI 33.57 kg/m²   Oxygen Saturation Interpretation: Normal      ---------------------------------------------------PHYSICAL EXAM--------------------------------------      Constitutional/General: Alert and oriented x3, well appearing, non toxic in NAD  Head: Normocephalic and atraumatic. Bilateral ears show mild otitis effusion but no erythema or bulging. Eyes: PERRL, EOMI. Some mild bilateral conjunctival injection with clear watery drainage  Mouth: Oropharynx clear, handling secretions, no trismus  Neck: Supple, full ROM,   Pulmonary: Lungs clear to auscultation bilaterally, no wheezes, rales, or rhonchi. Not in respiratory distress  Cardiovascular:  Regular rate and rhythm, no murmurs, gallops, or rubs. 2+ distal pulses  Abdomen: Soft, non tender, non distended,   Extremities: Moves all extremities x 4. Warm and well perfused  Skin: warm and dry without rash  Neurologic: GCS 15,  Psych: Normal Affect      ------------------------------ ED COURSE/MEDICAL DECISION MAKING----------------------  Medications   methylPREDNISolone sodium (SOLU-MEDROL) injection 80 mg (has no administration in time range)         ED COURSE:       Medical Decision Making:    Patient presents with likely allergies. She has watery, itchy eyes, a dry scratchy throat, some ear fullness, and sinus congestion and drainage. She does have bilateral otitis effusions but no signs of otitis media. Her lungs are clear and equal bilaterally and her vital signs are stable. Patient has a history of allergies and is not taking anything for it. Patient will be given a shot of Solu-Medrol IM and be prescribed a short course of antihistamine and decongestant. She is instructed to follow-up with her family doctor and return to the emergency department for any new or worsening complaints. Counseling:    The emergency provider has spoken with the patient and discussed todays results, in addition to providing specific details for the plan of care and counseling regarding the diagnosis and prognosis. Questions are answered at this time and they are agreeable with the plan.      --------------------------------- IMPRESSION AND DISPOSITION ---------------------------------    IMPRESSION  1.  Sinobronchitis        DISPOSITION  Disposition: Discharge to home  Patient condition is stable         Brian Crawford DO  01/28/20 5548

## 2020-02-18 ENCOUNTER — HOSPITAL ENCOUNTER (EMERGENCY)
Age: 25
Discharge: HOME | End: 2020-02-18
Attending: EMERGENCY MEDICINE
Payer: COMMERCIAL

## 2020-02-18 VITALS
HEART RATE: 112 BPM | DIASTOLIC BLOOD PRESSURE: 77 MMHG | SYSTOLIC BLOOD PRESSURE: 113 MMHG | BODY MASS INDEX: 33.57 KG/M2 | WEIGHT: 208 LBS | TEMPERATURE: 98 F | RESPIRATION RATE: 20 BRPM | OXYGEN SATURATION: 96 %

## 2020-02-18 PROCEDURE — G0381 LEV 2 HOSP TYPE B ED VISIT: HCPCS

## 2020-02-18 RX ORDER — PSEUDOEPHEDRINE HCL 120 MG/1
120 TABLET, FILM COATED, EXTENDED RELEASE ORAL EVERY 12 HOURS
Qty: 14 TABLET | Refills: 0 | Status: SHIPPED | OUTPATIENT
Start: 2020-02-18 | End: 2020-02-25

## 2020-02-18 RX ORDER — BENZONATATE 100 MG/1
100 CAPSULE ORAL 3 TIMES DAILY PRN
Qty: 21 CAPSULE | Refills: 0 | Status: SHIPPED | OUTPATIENT
Start: 2020-02-18 | End: 2020-02-25

## 2020-02-18 NOTE — ED PROVIDER NOTES
Department of Emergency Medicine   ED  Provider Note  Admit Date/RoomTime: 2020 12:38 PM  ED Room: 20  12:59 PM      HPI: Betzy Spring is a 30 y.o. female with a past medical history of  has a past medical history of Eczema, Environmental allergies, and Missed . presenting with complaints of a cough with nasal congestion. The patient states that these symptoms began gradually. The history is obtained from the patient. Patient does complain of a mild cough associated with it that is nonproductive. Patient denies excessive fatigue or sleeping greater than 18 hours a day. Patient denies exposure to mononucleosis. The patient denies any abdominal pain, left upper quadrant fullness, or early satiety. The patient also denies difficulty breathing, hemoptysis, neck pain/stiffness, or blurry vision, or chest pain. Sx have persisted and are mildly worse which is what prompted the visit today. Has had exposure to sick contacts        Review of Systems:   Pertinent positives and negatives are stated within HPI, all other systems reviewed and are negative.           --------------------------------------------- PAST HISTORY ---------------------------------------------  Past Medical History:  has a past medical history of Eczema, Environmental allergies, and Missed . Past Surgical History:  has a past surgical history that includes Dilation and curettage of uterus (N/A, 10/25/2019). Social History:  reports that she is a non-smoker but has been exposed to tobacco smoke. She has never used smokeless tobacco. She reports that she does not drink alcohol or use drugs. Family History: family history is not on file. The patients home medications have been reviewed. Allergies: Patient has no known allergies. Physical exam:  Constitutional: Vital signs are reviewed the patient is comfortable. The patient is alert and oriented and conversant. Head:  The head is atraumatic and normocephalic. Eyes: No discharge is present from the eyes. The sclera are normal.  Ears: TMs bilaterally demonstrate no erythema, no bulging and no evidence of infection. Mouth: The oropharynx demonstrates a small amount of erythema bilaterally. There is no tonsillar enlargement nor is there any exudate present. No uvular deviation or edema. No tonsillary asymmetry. Floor of the mouth soft, no trismus, handling secretions. Neck: Normal range of motion is achieved in the neck. There is no JVD present. No meningeal signs are present   Anterior cervical adenopathy is absent. Respiratory: The chest is nontender. Breath sounds are clear to auscultation bilaterally. No wheezes, rales, or rhonchi. There is no respiratory distress. Cardiovascular: Heart shows a regular rate and rhythm without murmurs, rubs, clicks or gallops. Abdominal exam: The abdomen is non tender without evidence of peritoneal signs. Specific attention to the left upper quadrant with palpation of the spleen demonstrates no organomegaly or tenderness  Skin: warm and dry, without rash    -------------------------------------------------- RESULTS -------------------------------------------------    LABS:  No results found for this visit on 02/18/20. RADIOLOGY:  Interpreted by Radiologist.  No orders to display             ------------------------- NURSING NOTES AND VITALS REVIEWED ---------------------------   The nursing notes within the ED encounter and vital signs as below have been reviewed. /77   Pulse 112   Temp 98 °F (36.7 °C) (Oral)   Resp 20   Wt 208 lb (94.3 kg)   LMP 01/31/2020   SpO2 96%   BMI 33.57 kg/m²   Oxygen Saturation Interpretation: Normal    The patients available past medical records and past encounters were reviewed.         ------------------------------ ED COURSE/MEDICAL DECISION MAKING----------------------  Medications - No data to display          Medical Decision Making:

## 2020-04-06 ENCOUNTER — HOSPITAL ENCOUNTER (EMERGENCY)
Age: 25
Discharge: HOME | End: 2020-04-06
Attending: EMERGENCY MEDICINE
Payer: COMMERCIAL

## 2020-04-06 VITALS
OXYGEN SATURATION: 98 % | HEIGHT: 67 IN | WEIGHT: 203 LBS | BODY MASS INDEX: 31.86 KG/M2 | HEART RATE: 75 BPM | SYSTOLIC BLOOD PRESSURE: 111 MMHG | DIASTOLIC BLOOD PRESSURE: 71 MMHG | TEMPERATURE: 98.3 F | RESPIRATION RATE: 16 BRPM

## 2020-04-06 PROCEDURE — G0382 LEV 3 HOSP TYPE B ED VISIT: HCPCS

## 2020-04-06 RX ORDER — POLYETHYLENE GLYCOL 3350 17 G/17G
17 POWDER ORAL DAILY
Qty: 119 BOTTLE | Refills: 0 | Status: SHIPPED | OUTPATIENT
Start: 2020-04-06 | End: 2022-08-10

## 2020-04-06 ASSESSMENT — PAIN DESCRIPTION - FREQUENCY: FREQUENCY: CONTINUOUS

## 2020-04-06 ASSESSMENT — PAIN DESCRIPTION - PROGRESSION: CLINICAL_PROGRESSION: NOT CHANGED

## 2020-04-06 ASSESSMENT — PAIN DESCRIPTION - ONSET: ONSET: ON-GOING

## 2020-04-06 ASSESSMENT — PAIN DESCRIPTION - LOCATION: LOCATION: ABDOMEN

## 2020-04-06 ASSESSMENT — PAIN SCALES - GENERAL: PAINLEVEL_OUTOF10: 6

## 2020-04-06 ASSESSMENT — PAIN DESCRIPTION - DESCRIPTORS: DESCRIPTORS: PATIENT UNABLE TO DESCRIBE

## 2020-12-31 ENCOUNTER — HOSPITAL ENCOUNTER (OUTPATIENT)
Age: 25
Setting detail: OBSERVATION
Discharge: HOME | End: 2021-01-01
Attending: EMERGENCY MEDICINE | Admitting: OBSTETRICS & GYNECOLOGY
Payer: COMMERCIAL

## 2020-12-31 ENCOUNTER — APPOINTMENT (OUTPATIENT)
Dept: ULTRASOUND IMAGING | Age: 25
End: 2020-12-31
Payer: COMMERCIAL

## 2020-12-31 DIAGNOSIS — N10 ACUTE PYELONEPHRITIS: Primary | ICD-10-CM

## 2020-12-31 PROBLEM — O23.42 UTI (URINARY TRACT INFECTION) IN PREGNANCY IN SECOND TRIMESTER: Status: ACTIVE | Noted: 2020-12-31

## 2020-12-31 PROBLEM — N12 PYELONEPHRITIS: Status: ACTIVE | Noted: 2020-12-31

## 2020-12-31 LAB
ALBUMIN SERPL-MCNC: 3.3 G/DL (ref 3.5–5.2)
ALP BLD-CCNC: 89 U/L (ref 35–104)
ALT SERPL-CCNC: <5 U/L (ref 0–32)
ANION GAP SERPL CALCULATED.3IONS-SCNC: 10 MMOL/L (ref 7–16)
AST SERPL-CCNC: 9 U/L (ref 0–31)
BACTERIA: ABNORMAL /HPF
BASOPHILS ABSOLUTE: 0.02 E9/L (ref 0–0.2)
BASOPHILS RELATIVE PERCENT: 0.2 % (ref 0–2)
BILIRUB SERPL-MCNC: 0.2 MG/DL (ref 0–1.2)
BILIRUBIN URINE: NEGATIVE
BLOOD, URINE: ABNORMAL
BUN BLDV-MCNC: 5 MG/DL (ref 6–20)
CALCIUM SERPL-MCNC: 8.8 MG/DL (ref 8.6–10.2)
CHLORIDE BLD-SCNC: 105 MMOL/L (ref 98–107)
CLARITY: ABNORMAL
CO2: 23 MMOL/L (ref 22–29)
COLOR: YELLOW
CREAT SERPL-MCNC: 0.5 MG/DL (ref 0.5–1)
EOSINOPHILS ABSOLUTE: 0.04 E9/L (ref 0.05–0.5)
EOSINOPHILS RELATIVE PERCENT: 0.5 % (ref 0–6)
EPITHELIAL CELLS, UA: ABNORMAL /HPF
GFR AFRICAN AMERICAN: >60
GFR NON-AFRICAN AMERICAN: >60 ML/MIN/1.73
GLUCOSE BLD-MCNC: 78 MG/DL (ref 74–99)
GLUCOSE URINE: NEGATIVE MG/DL
HCT VFR BLD CALC: 32.4 % (ref 34–48)
HEMOGLOBIN: 11.5 G/DL (ref 11.5–15.5)
IMMATURE GRANULOCYTES #: 0.05 E9/L
IMMATURE GRANULOCYTES %: 0.6 % (ref 0–5)
KETONES, URINE: 15 MG/DL
LEUKOCYTE ESTERASE, URINE: ABNORMAL
LYMPHOCYTES ABSOLUTE: 1.49 E9/L (ref 1.5–4)
LYMPHOCYTES RELATIVE PERCENT: 17.6 % (ref 20–42)
MCH RBC QN AUTO: 30.6 PG (ref 26–35)
MCHC RBC AUTO-ENTMCNC: 35.5 % (ref 32–34.5)
MCV RBC AUTO: 86.2 FL (ref 80–99.9)
MONOCYTES ABSOLUTE: 0.8 E9/L (ref 0.1–0.95)
MONOCYTES RELATIVE PERCENT: 9.4 % (ref 2–12)
NEUTROPHILS ABSOLUTE: 6.07 E9/L (ref 1.8–7.3)
NEUTROPHILS RELATIVE PERCENT: 71.7 % (ref 43–80)
NITRITE, URINE: POSITIVE
PDW BLD-RTO: 12.1 FL (ref 11.5–15)
PH UA: 6.5 (ref 5–9)
PLATELET # BLD: 344 E9/L (ref 130–450)
PMV BLD AUTO: 10.2 FL (ref 7–12)
POTASSIUM REFLEX MAGNESIUM: 3.6 MMOL/L (ref 3.5–5)
PROTEIN UA: 30 MG/DL
RBC # BLD: 3.76 E12/L (ref 3.5–5.5)
RBC UA: ABNORMAL /HPF (ref 0–2)
SODIUM BLD-SCNC: 138 MMOL/L (ref 132–146)
SPECIFIC GRAVITY UA: 1.02 (ref 1–1.03)
TOTAL PROTEIN: 6.5 G/DL (ref 6.4–8.3)
UROBILINOGEN, URINE: 0.2 E.U./DL
WBC # BLD: 8.5 E9/L (ref 4.5–11.5)
WBC UA: >20 /HPF (ref 0–5)

## 2020-12-31 PROCEDURE — 96376 TX/PRO/DX INJ SAME DRUG ADON: CPT

## 2020-12-31 PROCEDURE — 85025 COMPLETE CBC W/AUTO DIFF WBC: CPT

## 2020-12-31 PROCEDURE — 76775 US EXAM ABDO BACK WALL LIM: CPT

## 2020-12-31 PROCEDURE — 99283 EMERGENCY DEPT VISIT LOW MDM: CPT

## 2020-12-31 PROCEDURE — 2580000003 HC RX 258: Performed by: OBSTETRICS & GYNECOLOGY

## 2020-12-31 PROCEDURE — G0378 HOSPITAL OBSERVATION PER HR: HCPCS

## 2020-12-31 PROCEDURE — 6360000002 HC RX W HCPCS: Performed by: EMERGENCY MEDICINE

## 2020-12-31 PROCEDURE — 6370000000 HC RX 637 (ALT 250 FOR IP): Performed by: EMERGENCY MEDICINE

## 2020-12-31 PROCEDURE — 96365 THER/PROPH/DIAG IV INF INIT: CPT

## 2020-12-31 PROCEDURE — 6360000002 HC RX W HCPCS: Performed by: OBSTETRICS & GYNECOLOGY

## 2020-12-31 PROCEDURE — 6370000000 HC RX 637 (ALT 250 FOR IP): Performed by: OBSTETRICS & GYNECOLOGY

## 2020-12-31 PROCEDURE — 81001 URINALYSIS AUTO W/SCOPE: CPT

## 2020-12-31 PROCEDURE — 96360 HYDRATION IV INFUSION INIT: CPT

## 2020-12-31 PROCEDURE — 2580000003 HC RX 258: Performed by: EMERGENCY MEDICINE

## 2020-12-31 PROCEDURE — 36415 COLL VENOUS BLD VENIPUNCTURE: CPT

## 2020-12-31 PROCEDURE — 80053 COMPREHEN METABOLIC PANEL: CPT

## 2020-12-31 PROCEDURE — 96375 TX/PRO/DX INJ NEW DRUG ADDON: CPT

## 2020-12-31 PROCEDURE — 87186 SC STD MICRODIL/AGAR DIL: CPT

## 2020-12-31 PROCEDURE — 87088 URINE BACTERIA CULTURE: CPT

## 2020-12-31 RX ORDER — GENTAMICIN SULFATE 60 MG/50ML
120 INJECTION, SOLUTION INTRAVENOUS EVERY 12 HOURS
Status: DISCONTINUED | OUTPATIENT
Start: 2020-12-31 | End: 2021-01-01 | Stop reason: HOSPADM

## 2020-12-31 RX ORDER — 0.9 % SODIUM CHLORIDE 0.9 %
500 INTRAVENOUS SOLUTION INTRAVENOUS ONCE
Status: COMPLETED | OUTPATIENT
Start: 2020-12-31 | End: 2020-12-31

## 2020-12-31 RX ORDER — ACETAMINOPHEN 500 MG
1000 TABLET ORAL ONCE
Status: COMPLETED | OUTPATIENT
Start: 2020-12-31 | End: 2020-12-31

## 2020-12-31 RX ORDER — SODIUM CHLORIDE 9 MG/ML
INJECTION, SOLUTION INTRAVENOUS CONTINUOUS
Status: DISCONTINUED | OUTPATIENT
Start: 2020-12-31 | End: 2021-01-01 | Stop reason: HOSPADM

## 2020-12-31 RX ORDER — ACETAMINOPHEN 325 MG/1
650 TABLET ORAL EVERY 4 HOURS PRN
Status: DISCONTINUED | OUTPATIENT
Start: 2020-12-31 | End: 2021-01-01 | Stop reason: HOSPADM

## 2020-12-31 RX ORDER — 0.9 % SODIUM CHLORIDE 0.9 %
1000 INTRAVENOUS SOLUTION INTRAVENOUS ONCE
Status: COMPLETED | OUTPATIENT
Start: 2020-12-31 | End: 2020-12-31

## 2020-12-31 RX ADMIN — ACETAMINOPHEN 650 MG: 325 TABLET, FILM COATED ORAL at 15:35

## 2020-12-31 RX ADMIN — WATER 1 G: 1 INJECTION INTRAMUSCULAR; INTRAVENOUS; SUBCUTANEOUS at 23:47

## 2020-12-31 RX ADMIN — WATER 1 G: 1 INJECTION INTRAMUSCULAR; INTRAVENOUS; SUBCUTANEOUS at 11:57

## 2020-12-31 RX ADMIN — ACETAMINOPHEN 650 MG: 325 TABLET, FILM COATED ORAL at 20:52

## 2020-12-31 RX ADMIN — SODIUM CHLORIDE 1000 ML: 9 INJECTION, SOLUTION INTRAVENOUS at 10:48

## 2020-12-31 RX ADMIN — GENTAMICIN SULFATE 120 MG: 60 INJECTION, SOLUTION INTRAVENOUS at 13:45

## 2020-12-31 RX ADMIN — ACETAMINOPHEN 1000 MG: 500 TABLET, FILM COATED ORAL at 10:49

## 2020-12-31 RX ADMIN — SODIUM CHLORIDE 500 ML: 9 INJECTION, SOLUTION INTRAVENOUS at 13:44

## 2020-12-31 RX ADMIN — SODIUM CHLORIDE: 9 INJECTION, SOLUTION INTRAVENOUS at 14:23

## 2020-12-31 ASSESSMENT — PAIN SCALES - GENERAL
PAINLEVEL_OUTOF10: 5
PAINLEVEL_OUTOF10: 0
PAINLEVEL_OUTOF10: 8
PAINLEVEL_OUTOF10: 10
PAINLEVEL_OUTOF10: 5

## 2020-12-31 ASSESSMENT — PAIN DESCRIPTION - LOCATION: LOCATION: FLANK

## 2020-12-31 ASSESSMENT — PAIN DESCRIPTION - ORIENTATION: ORIENTATION: RIGHT

## 2020-12-31 ASSESSMENT — PAIN DESCRIPTION - FREQUENCY: FREQUENCY: CONTINUOUS

## 2020-12-31 ASSESSMENT — PAIN DESCRIPTION - PAIN TYPE: TYPE: ACUTE PAIN

## 2020-12-31 NOTE — H&P
Subjective:      Casey Yeboah is an 30 y.o. female at 23 and 3/7 weeks gestation presenting with   Chief Complaint   Patient presents with    Flank Pain     right constant x 2 weeks-denies urinary discomfort reports foul odor 19 weeks pregnant   Patient was evaluated in the emergency room. Was diagnosed with severe UTI with possible pyelonephritis and consequently was admitted for further management. Patient received Rocephin IM in ER. Denies any bleeding or fluid leak. Fetal Movement: normal.  Past Medical History:   Diagnosis Date    Eczema     Environmental allergies     Missed  10/24/2019       Review of Systems  Pertinent items are noted in HPI. Objective:      BP (!) 91/53   Pulse 97   Temp 98.8 °F (37.1 °C)   Resp 16   Ht 5' 6\" (1.676 m)   Wt 173 lb (78.5 kg)   LMP 2020   SpO2 99%   BMI 27.92 kg/m²   Blood pressure (!) 91/53, pulse 97, temperature 98.8 °F (37.1 °C), resp. rate 16, height 5' 6\" (1.676 m), weight 173 lb (78.5 kg), last menstrual period 2020, SpO2 99 %, unknown if currently breastfeeding. General:   alert, appears stated age and cooperative   Cervix:   Deferred.    FHT:   135 BPM     Lab Review    CBC:   Lab Results   Component Value Date    WBC 8.5 2020    RBC 3.76 2020    HGB 11.5 2020    HCT 32.4 2020    MCV 86.2 2020    MCH 30.6 2020    MCHC 35.5 2020    RDW 12.1 2020     2020    MPV 10.2 2020     CMP:    Lab Results   Component Value Date     2020    K 3.6 2020     2020    CO2 23 2020    BUN 5 2020    CREATININE 0.5 2020    GFRAA >60 2020    LABGLOM >60 2020    GLUCOSE 78 2020    PROT 6.5 2020    LABALBU 3.3 2020    CALCIUM 8.8 2020    BILITOT 0.2 2020    ALKPHOS 89 2020    AST 9 2020    ALT <5 2020     U/A:    Lab Results   Component Value Date    COLORU Yellow 2020 PHUR 6.5 12/31/2020    WBCUA >20 12/31/2020    RBCUA 0-1 12/31/2020    RBCUA 0-1 06/22/2013    BACTERIA MANY 12/31/2020    CLARITYU SLCLOUDY 12/31/2020    SPECGRAV 1.025 12/31/2020    LEUKOCYTESUR MODERATE 12/31/2020    UROBILINOGEN 0.2 12/31/2020    BILIRUBINUR Negative 12/31/2020    BLOODU SMALL 12/31/2020    GLUCOSEU Negative 12/31/2020          Assessment:     UTI in the second trimester  Right pyelonephritis    Plan:     IV fluids, IV Rocephin and gentamicin, repeat UA in the morning, general diet, observation    Jess Nunze MD,12/31/2020 5:26 PM

## 2020-12-31 NOTE — ED PROVIDER NOTES
Normocephalic and atraumatic. Eyes:      Conjunctiva/sclera: Conjunctivae normal.   Neck:      Musculoskeletal: Normal range of motion and neck supple. Cardiovascular:      Rate and Rhythm: Regular rhythm. Tachycardia present. Heart sounds: Normal heart sounds. No murmur. Pulmonary:      Effort: Pulmonary effort is normal. No respiratory distress. Breath sounds: Normal breath sounds. Abdominal:      General: Bowel sounds are normal.      Palpations: Abdomen is soft. Tenderness: There is no abdominal tenderness. There is right CVA tenderness. There is no left CVA tenderness, guarding or rebound. Skin:     General: Skin is warm and dry. Coloration: Skin is not jaundiced or pale. Neurological:      Mental Status: She is alert and oriented to person, place, and time. Procedures     MDM   Presents to the ED with a complaint of right flank pain and the concern for possible kidney infection. She is currently 19 weeks pregnant. Urinalysis did show evidence of infection. Clinically she does have pyelonephritis based on her right flank pain. Additional labs were generally unremarkable. Normal CBC without evidence of leukocytosis or anemia. CMP showed no evidence of electrolyte abnormality or renal insufficiency. Discussed results with her OB and he wanted to keep her in overnight for IV antibiotics. She was given a dose of Rocephin in the ED. ED Course as of Dec 31 1303   Thu Dec 31, 2020   1135 Patient resting in bed in no distress. Discussed results of labs with her. Discussed ultrasound as well. Discussed that based on her clinical exam and the urinalysis she most likely has pyelonephritis. Will discuss case with her OB/GYN to see if he would like to treat her as an outpatient or as an inpatient. [MS]   1135 Patient resting in bed in no distress. Discussed results of labs and imaging with her.   Will discuss with her PCP whether or not he wants to treat her as an inpatient or an outpatient. [MS]      ED Course User Index  [MS] Jeanine Rucker, DO       --------------------------------------------- PAST HISTORY ---------------------------------------------  Past Medical History:  has a past medical history of Eczema, Environmental allergies, and Missed . Past Surgical History:  has a past surgical history that includes Dilation and curettage of uterus (N/A, 10/25/2019). Social History:  reports that she is a non-smoker but has been exposed to tobacco smoke. She has never used smokeless tobacco. She reports that she does not drink alcohol or use drugs. Family History: family history is not on file. The patients home medications have been reviewed. Allergies: Patient has no known allergies.     -------------------------------------------------- RESULTS -------------------------------------------------    LABS:  Results for orders placed or performed during the hospital encounter of 20   CBC Auto Differential   Result Value Ref Range    WBC 8.5 4.5 - 11.5 E9/L    RBC 3.76 3.50 - 5.50 E12/L    Hemoglobin 11.5 11.5 - 15.5 g/dL    Hematocrit 32.4 (L) 34.0 - 48.0 %    MCV 86.2 80.0 - 99.9 fL    MCH 30.6 26.0 - 35.0 pg    MCHC 35.5 (H) 32.0 - 34.5 %    RDW 12.1 11.5 - 15.0 fL    Platelets 922 609 - 459 E9/L    MPV 10.2 7.0 - 12.0 fL    Neutrophils % 71.7 43.0 - 80.0 %    Immature Granulocytes % 0.6 0.0 - 5.0 %    Lymphocytes % 17.6 (L) 20.0 - 42.0 %    Monocytes % 9.4 2.0 - 12.0 %    Eosinophils % 0.5 0.0 - 6.0 %    Basophils % 0.2 0.0 - 2.0 %    Neutrophils Absolute 6.07 1.80 - 7.30 E9/L    Immature Granulocytes # 0.05 E9/L    Lymphocytes Absolute 1.49 (L) 1.50 - 4.00 E9/L    Monocytes Absolute 0.80 0.10 - 0.95 E9/L    Eosinophils Absolute 0.04 (L) 0.05 - 0.50 E9/L    Basophils Absolute 0.02 0.00 - 0.20 E9/L   Comprehensive Metabolic Panel w/ Reflex to MG   Result Value Ref Range    Sodium 138 132 - 146 mmol/L    Potassium reflex Magnesium 3.6 3.5 - 5.0 mmol/L    Chloride 105 98 - 107 mmol/L    CO2 23 22 - 29 mmol/L    Anion Gap 10 7 - 16 mmol/L    Glucose 78 74 - 99 mg/dL    BUN 5 (L) 6 - 20 mg/dL    CREATININE 0.5 0.5 - 1.0 mg/dL    GFR Non-African American >60 >=60 mL/min/1.73    GFR African American >60     Calcium 8.8 8.6 - 10.2 mg/dL    Total Protein 6.5 6.4 - 8.3 g/dL    Alb 3.3 (L) 3.5 - 5.2 g/dL    Total Bilirubin 0.2 0.0 - 1.2 mg/dL    Alkaline Phosphatase 89 35 - 104 U/L    ALT <5 0 - 32 U/L    AST 9 0 - 31 U/L   Urinalysis, reflex to microscopic   Result Value Ref Range    Color, UA Yellow Straw/Yellow    Clarity, UA SLCLOUDY Clear    Glucose, Ur Negative Negative mg/dL    Bilirubin Urine Negative Negative    Ketones, Urine 15 (A) Negative mg/dL    Specific Gravity, UA 1.025 1.005 - 1.030    Blood, Urine SMALL (A) Negative    pH, UA 6.5 5.0 - 9.0    Protein, UA 30 (A) Negative mg/dL    Urobilinogen, Urine 0.2 <2.0 E.U./dL    Nitrite, Urine POSITIVE (A) Negative    Leukocyte Esterase, Urine MODERATE (A) Negative   Microscopic Urinalysis   Result Value Ref Range    WBC, UA >20 (A) 0 - 5 /HPF    RBC, UA 0-1 0 - 2 /HPF    Epithelial Cells, UA RARE /HPF    Bacteria, UA MANY (A) None Seen /HPF       RADIOLOGY:  US RETROPERITONEAL LIMITED   Final Result   No sonographic evidence of acute renal pathology. No ultrasound evidence of   calculus or hydronephrosis.          ------------------------- NURSING NOTES AND VITALS REVIEWED ---------------------------  Date / Time Roomed:  12/31/2020  9:50 AM  ED Bed Assignment:  [id number]/[id number]    The nursing notes within the ED encounter and vital signs as below have been reviewed.      Patient Vitals for the past 24 hrs:   BP Temp Temp src Pulse Resp SpO2 Height Weight   12/31/20 1238 97/61 98.1 °F (36.7 °C) Oral 87 18 99 % -- --   12/31/20 1156 (!) 108/55 97.8 °F (36.6 °C) Oral 86 20 100 % -- --   12/31/20 0944 (!) 110/55 -- -- 101 18 99 % 5' 6\" (1.676 m) 174 lb (78.9 kg)       Oxygen Saturation Interpretation: Normal    ------------------------------------------ PROGRESS NOTES ------------------------------------------  Re-evaluation(s):  Please see ED course above. Counseling:  I have spoken with the patient and discussed todays results, in addition to providing specific details for the plan of care and counseling regarding the diagnosis and prognosis. Their questions are answered at this time and they are agreeable with the plan of admission.    --------------------------------- ADDITIONAL PROVIDER NOTES ---------------------------------  Consultations:  Time: 1142. Spoke with Dr. Adolfo Sylvester.  Discussed case. He will admit the patient. This patient's ED course included: a personal history and physicial examination, re-evaluation prior to disposition, multiple bedside re-evaluations, IV medications, cardiac monitoring, continuous pulse oximetry and complex medical decision making and emergency management    This patient has remained hemodynamically stable during their ED course. Diagnosis:  1. Acute pyelonephritis        Disposition:  Patient's disposition: Admit to L&D  Patient's condition is fair.          Dominic Summers, DO  12/31/20 2134

## 2020-12-31 NOTE — PROGRESS NOTES
Care assumed at bedside. Patient resting but states pain is starting to come back.   Provided ginger ale per request.  Assessment completed

## 2020-12-31 NOTE — PROGRESS NOTES
[facility] 19 sent from ER for HX rt flank pain. Abd soft, non-tender. Denies bleeding or leakage of fluid. Perceives fetal movement. States she has had back pain for a couple of weeks. \"Got worse today\" and went to ER. FHR obtained with doppler. Dr. Ramya Queen office notified, informed he will call RN back. Ice chips given.

## 2021-01-01 VITALS
BODY MASS INDEX: 27.8 KG/M2 | HEIGHT: 66 IN | WEIGHT: 173 LBS | DIASTOLIC BLOOD PRESSURE: 72 MMHG | OXYGEN SATURATION: 99 % | SYSTOLIC BLOOD PRESSURE: 95 MMHG | TEMPERATURE: 98.4 F | RESPIRATION RATE: 16 BRPM | HEART RATE: 80 BPM

## 2021-01-01 LAB
BACTERIA: ABNORMAL /HPF
BILIRUBIN URINE: NEGATIVE
BLOOD, URINE: ABNORMAL
CLARITY: CLEAR
COLOR: ABNORMAL
EPITHELIAL CELLS, UA: ABNORMAL /HPF
GLUCOSE URINE: NEGATIVE MG/DL
KETONES, URINE: NEGATIVE MG/DL
LEUKOCYTE ESTERASE, URINE: ABNORMAL
NITRITE, URINE: NEGATIVE
PH UA: 7 (ref 5–9)
PROTEIN UA: NEGATIVE MG/DL
RBC UA: ABNORMAL /HPF (ref 0–2)
SPECIFIC GRAVITY UA: 1.01 (ref 1–1.03)
UROBILINOGEN, URINE: 0.2 E.U./DL
WBC UA: ABNORMAL /HPF (ref 0–5)

## 2021-01-01 PROCEDURE — 96376 TX/PRO/DX INJ SAME DRUG ADON: CPT

## 2021-01-01 PROCEDURE — 2580000003 HC RX 258: Performed by: OBSTETRICS & GYNECOLOGY

## 2021-01-01 PROCEDURE — 81001 URINALYSIS AUTO W/SCOPE: CPT

## 2021-01-01 PROCEDURE — G0378 HOSPITAL OBSERVATION PER HR: HCPCS

## 2021-01-01 PROCEDURE — 96366 THER/PROPH/DIAG IV INF ADDON: CPT

## 2021-01-01 PROCEDURE — 6360000002 HC RX W HCPCS: Performed by: OBSTETRICS & GYNECOLOGY

## 2021-01-01 PROCEDURE — 6370000000 HC RX 637 (ALT 250 FOR IP): Performed by: OBSTETRICS & GYNECOLOGY

## 2021-01-01 RX ORDER — NITROFURANTOIN 25; 75 MG/1; MG/1
100 CAPSULE ORAL EVERY 12 HOURS SCHEDULED
Status: DISCONTINUED | OUTPATIENT
Start: 2021-01-01 | End: 2021-01-01 | Stop reason: HOSPADM

## 2021-01-01 RX ORDER — NITROFURANTOIN 25; 75 MG/1; MG/1
100 CAPSULE ORAL EVERY 12 HOURS SCHEDULED
Qty: 14 CAPSULE | Refills: 0 | Status: SHIPPED | OUTPATIENT
Start: 2021-01-01 | End: 2021-01-08

## 2021-01-01 RX ADMIN — SODIUM CHLORIDE: 9 INJECTION, SOLUTION INTRAVENOUS at 09:43

## 2021-01-01 RX ADMIN — WATER 1 G: 1 INJECTION INTRAMUSCULAR; INTRAVENOUS; SUBCUTANEOUS at 12:10

## 2021-01-01 RX ADMIN — GENTAMICIN SULFATE 120 MG: 60 INJECTION, SOLUTION INTRAVENOUS at 02:49

## 2021-01-01 RX ADMIN — GENTAMICIN SULFATE 120 MG: 60 INJECTION, SOLUTION INTRAVENOUS at 15:46

## 2021-01-01 RX ADMIN — ACETAMINOPHEN 650 MG: 325 TABLET, FILM COATED ORAL at 03:53

## 2021-01-01 RX ADMIN — ACETAMINOPHEN 650 MG: 325 TABLET, FILM COATED ORAL at 15:44

## 2021-01-01 RX ADMIN — NITROFURANTOIN (MONOHYDRATE/MACROCRYSTALS) 100 MG: 75; 25 CAPSULE ORAL at 12:43

## 2021-01-01 ASSESSMENT — PAIN DESCRIPTION - LOCATION: LOCATION: BACK

## 2021-01-01 ASSESSMENT — PAIN SCALES - GENERAL
PAINLEVEL_OUTOF10: 6
PAINLEVEL_OUTOF10: 0

## 2021-01-01 NOTE — PROGRESS NOTES
Starting to feel much better. Mild right flank pain. No dysuria or urgency    Blood pressure 100/69, pulse 96, temperature 97.7 °F (36.5 °C), temperature source Oral, resp. rate 16, height 5' 6\" (1.676 m), weight 173 lb (78.5 kg), last menstrual period 08/17/2020, SpO2 99 %, unknown if currently breastfeeding. Physical exam: Mild right CVA tenderness    Urine culture is showing over 100,000 colonies of possible E.  Coli    Assessment  Right pyelonephritis, resolving    Plan  Urinalysis  Possible discharge today on Macrobid 100 mg p.o. twice daily

## 2021-01-01 NOTE — PROGRESS NOTES
fhts checked per doppler and 148. Fetal movement perceived. Patient states dis not care for dinner tray, provided boxed sandwich.

## 2021-01-02 LAB
ORGANISM: ABNORMAL
URINE CULTURE, ROUTINE: ABNORMAL

## 2021-02-25 ENCOUNTER — HOSPITAL ENCOUNTER (OUTPATIENT)
Age: 26
Discharge: HOME | End: 2021-02-25
Payer: COMMERCIAL

## 2021-02-25 LAB
BASOPHILS ABSOLUTE: 0.03 E9/L (ref 0–0.2)
BASOPHILS RELATIVE PERCENT: 0.3 % (ref 0–2)
EOSINOPHILS ABSOLUTE: 0.05 E9/L (ref 0.05–0.5)
EOSINOPHILS RELATIVE PERCENT: 0.5 % (ref 0–6)
GLUCOSE TOLERANCE SCREEN 50G: 89 MG/DL (ref 70–140)
HCT VFR BLD CALC: 32.7 % (ref 34–48)
HEMOGLOBIN: 11.2 G/DL (ref 11.5–15.5)
IMMATURE GRANULOCYTES #: 0.07 E9/L
IMMATURE GRANULOCYTES %: 0.8 % (ref 0–5)
LYMPHOCYTES ABSOLUTE: 2.03 E9/L (ref 1.5–4)
LYMPHOCYTES RELATIVE PERCENT: 22.3 % (ref 20–42)
MCH RBC QN AUTO: 30.5 PG (ref 26–35)
MCHC RBC AUTO-ENTMCNC: 34.3 % (ref 32–34.5)
MCV RBC AUTO: 89.1 FL (ref 80–99.9)
MONOCYTES ABSOLUTE: 0.63 E9/L (ref 0.1–0.95)
MONOCYTES RELATIVE PERCENT: 6.9 % (ref 2–12)
NEUTROPHILS ABSOLUTE: 6.29 E9/L (ref 1.8–7.3)
NEUTROPHILS RELATIVE PERCENT: 69.2 % (ref 43–80)
PDW BLD-RTO: 12.5 FL (ref 11.5–15)
PLATELET # BLD: 328 E9/L (ref 130–450)
PMV BLD AUTO: 9.8 FL (ref 7–12)
RBC # BLD: 3.67 E12/L (ref 3.5–5.5)
WBC # BLD: 9.1 E9/L (ref 4.5–11.5)

## 2021-02-25 PROCEDURE — 85025 COMPLETE CBC W/AUTO DIFF WBC: CPT

## 2021-02-25 PROCEDURE — 36415 COLL VENOUS BLD VENIPUNCTURE: CPT

## 2021-02-25 PROCEDURE — 82950 GLUCOSE TEST: CPT

## 2021-05-20 ENCOUNTER — HOSPITAL ENCOUNTER (OUTPATIENT)
Age: 26
Discharge: HOME | End: 2021-05-22
Payer: COMMERCIAL

## 2021-05-20 PROCEDURE — U0005 INFEC AGEN DETEC AMPLI PROBE: HCPCS

## 2021-05-20 PROCEDURE — U0003 INFECTIOUS AGENT DETECTION BY NUCLEIC ACID (DNA OR RNA); SEVERE ACUTE RESPIRATORY SYNDROME CORONAVIRUS 2 (SARS-COV-2) (CORONAVIRUS DISEASE [COVID-19]), AMPLIFIED PROBE TECHNIQUE, MAKING USE OF HIGH THROUGHPUT TECHNOLOGIES AS DESCRIBED BY CMS-2020-01-R: HCPCS

## 2021-05-25 ENCOUNTER — APPOINTMENT (OUTPATIENT)
Dept: LABOR AND DELIVERY | Age: 26
DRG: 560 | End: 2021-05-25
Payer: COMMERCIAL

## 2021-05-25 ENCOUNTER — HOSPITAL ENCOUNTER (INPATIENT)
Age: 26
LOS: 2 days | Discharge: HOME | DRG: 560 | End: 2021-05-27
Attending: OBSTETRICS & GYNECOLOGY | Admitting: OBSTETRICS & GYNECOLOGY
Payer: COMMERCIAL

## 2021-05-25 PROBLEM — Z34.90 TERM PREGNANCY: Status: ACTIVE | Noted: 2021-05-25

## 2021-05-25 LAB
ABO/RH: NORMAL
ALBUMIN SERPL-MCNC: 3.2 G/DL (ref 3.5–5.2)
ALP BLD-CCNC: 166 U/L (ref 35–104)
ALT SERPL-CCNC: 5 U/L (ref 0–32)
AMPHETAMINE SCREEN, URINE: NOT DETECTED
ANION GAP SERPL CALCULATED.3IONS-SCNC: 11 MMOL/L (ref 7–16)
ANTIBODY SCREEN: NORMAL
AST SERPL-CCNC: 13 U/L (ref 0–31)
BARBITURATE SCREEN URINE: NOT DETECTED
BENZODIAZEPINE SCREEN, URINE: NOT DETECTED
BILIRUB SERPL-MCNC: 0.3 MG/DL (ref 0–1.2)
BUN BLDV-MCNC: 6 MG/DL (ref 6–20)
CALCIUM SERPL-MCNC: 8.7 MG/DL (ref 8.6–10.2)
CANNABINOID SCREEN URINE: NOT DETECTED
CHLORIDE BLD-SCNC: 104 MMOL/L (ref 98–107)
CO2: 21 MMOL/L (ref 22–29)
COCAINE METABOLITE SCREEN URINE: NOT DETECTED
CREAT SERPL-MCNC: 0.5 MG/DL (ref 0.5–1)
FENTANYL SCREEN, URINE: NOT DETECTED
GFR AFRICAN AMERICAN: >60
GFR NON-AFRICAN AMERICAN: >60 ML/MIN/1.73
GLUCOSE BLD-MCNC: 80 MG/DL (ref 74–99)
HCT VFR BLD CALC: 31.6 % (ref 34–48)
HEMOGLOBIN: 9.9 G/DL (ref 11.5–15.5)
Lab: NORMAL
MCH RBC QN AUTO: 26.5 PG (ref 26–35)
MCHC RBC AUTO-ENTMCNC: 31.3 % (ref 32–34.5)
MCV RBC AUTO: 84.7 FL (ref 80–99.9)
METHADONE SCREEN, URINE: NOT DETECTED
OPIATE SCREEN URINE: NOT DETECTED
OXYCODONE URINE: NOT DETECTED
PDW BLD-RTO: 13.8 FL (ref 11.5–15)
PHENCYCLIDINE SCREEN URINE: NOT DETECTED
PLATELET # BLD: 316 E9/L (ref 130–450)
PMV BLD AUTO: 10.4 FL (ref 7–12)
POTASSIUM SERPL-SCNC: 3.9 MMOL/L (ref 3.5–5)
RBC # BLD: 3.73 E12/L (ref 3.5–5.5)
SODIUM BLD-SCNC: 136 MMOL/L (ref 132–146)
TOTAL PROTEIN: 6.5 G/DL (ref 6.4–8.3)
WBC # BLD: 6.7 E9/L (ref 4.5–11.5)

## 2021-05-25 PROCEDURE — 80053 COMPREHEN METABOLIC PANEL: CPT

## 2021-05-25 PROCEDURE — 10907ZC DRAINAGE OF AMNIOTIC FLUID, THERAPEUTIC FROM PRODUCTS OF CONCEPTION, VIA NATURAL OR ARTIFICIAL OPENING: ICD-10-PCS | Performed by: OBSTETRICS & GYNECOLOGY

## 2021-05-25 PROCEDURE — 86901 BLOOD TYPING SEROLOGIC RH(D): CPT

## 2021-05-25 PROCEDURE — 36415 COLL VENOUS BLD VENIPUNCTURE: CPT

## 2021-05-25 PROCEDURE — 99222 1ST HOSP IP/OBS MODERATE 55: CPT | Performed by: ADVANCED PRACTICE MIDWIFE

## 2021-05-25 PROCEDURE — 86850 RBC ANTIBODY SCREEN: CPT

## 2021-05-25 PROCEDURE — 3E033VJ INTRODUCTION OF OTHER HORMONE INTO PERIPHERAL VEIN, PERCUTANEOUS APPROACH: ICD-10-PCS | Performed by: OBSTETRICS & GYNECOLOGY

## 2021-05-25 PROCEDURE — 2580000003 HC RX 258: Performed by: OBSTETRICS & GYNECOLOGY

## 2021-05-25 PROCEDURE — 80307 DRUG TEST PRSMV CHEM ANLYZR: CPT

## 2021-05-25 PROCEDURE — 7200000001 HC VAGINAL DELIVERY

## 2021-05-25 PROCEDURE — 6360000002 HC RX W HCPCS: Performed by: OBSTETRICS & GYNECOLOGY

## 2021-05-25 PROCEDURE — 1220000001 HC SEMI PRIVATE L&D R&B

## 2021-05-25 PROCEDURE — 6370000000 HC RX 637 (ALT 250 FOR IP): Performed by: OBSTETRICS & GYNECOLOGY

## 2021-05-25 PROCEDURE — 86900 BLOOD TYPING SEROLOGIC ABO: CPT

## 2021-05-25 PROCEDURE — 85027 COMPLETE CBC AUTOMATED: CPT

## 2021-05-25 RX ORDER — SODIUM CHLORIDE 0.9 % (FLUSH) 0.9 %
10 SYRINGE (ML) INJECTION PRN
Status: DISCONTINUED | OUTPATIENT
Start: 2021-05-25 | End: 2021-05-25

## 2021-05-25 RX ORDER — FERROUS SULFATE 325(65) MG
325 TABLET ORAL 2 TIMES DAILY WITH MEALS
Status: DISCONTINUED | OUTPATIENT
Start: 2021-05-26 | End: 2021-05-27 | Stop reason: HOSPADM

## 2021-05-25 RX ORDER — OXYCODONE HYDROCHLORIDE AND ACETAMINOPHEN 5; 325 MG/1; MG/1
2 TABLET ORAL EVERY 4 HOURS PRN
Status: DISCONTINUED | OUTPATIENT
Start: 2021-05-25 | End: 2021-05-27 | Stop reason: HOSPADM

## 2021-05-25 RX ORDER — DOCUSATE SODIUM 100 MG/1
100 CAPSULE, LIQUID FILLED ORAL 2 TIMES DAILY
Status: DISCONTINUED | OUTPATIENT
Start: 2021-05-25 | End: 2021-05-27 | Stop reason: HOSPADM

## 2021-05-25 RX ORDER — LIDOCAINE HYDROCHLORIDE 10 MG/ML
INJECTION, SOLUTION INFILTRATION; PERINEURAL
Status: DISCONTINUED
Start: 2021-05-25 | End: 2021-05-25 | Stop reason: WASHOUT

## 2021-05-25 RX ORDER — IBUPROFEN 800 MG/1
800 TABLET ORAL EVERY 6 HOURS PRN
Status: DISCONTINUED | OUTPATIENT
Start: 2021-05-25 | End: 2021-05-27 | Stop reason: HOSPADM

## 2021-05-25 RX ORDER — ACETAMINOPHEN 325 MG/1
650 TABLET ORAL EVERY 4 HOURS PRN
Status: DISCONTINUED | OUTPATIENT
Start: 2021-05-25 | End: 2021-05-27 | Stop reason: HOSPADM

## 2021-05-25 RX ORDER — SODIUM CHLORIDE, SODIUM LACTATE, POTASSIUM CHLORIDE, CALCIUM CHLORIDE 600; 310; 30; 20 MG/100ML; MG/100ML; MG/100ML; MG/100ML
INJECTION, SOLUTION INTRAVENOUS CONTINUOUS
Status: DISCONTINUED | OUTPATIENT
Start: 2021-05-25 | End: 2021-05-25

## 2021-05-25 RX ORDER — SODIUM CHLORIDE 0.9 % (FLUSH) 0.9 %
5-40 SYRINGE (ML) INJECTION PRN
Status: DISCONTINUED | OUTPATIENT
Start: 2021-05-25 | End: 2021-05-27 | Stop reason: HOSPADM

## 2021-05-25 RX ORDER — OXYCODONE HYDROCHLORIDE AND ACETAMINOPHEN 5; 325 MG/1; MG/1
1 TABLET ORAL EVERY 4 HOURS PRN
Status: DISCONTINUED | OUTPATIENT
Start: 2021-05-25 | End: 2021-05-27 | Stop reason: HOSPADM

## 2021-05-25 RX ORDER — ONDANSETRON 2 MG/ML
4 INJECTION INTRAMUSCULAR; INTRAVENOUS EVERY 6 HOURS PRN
Status: DISCONTINUED | OUTPATIENT
Start: 2021-05-25 | End: 2021-05-25

## 2021-05-25 RX ORDER — SODIUM CHLORIDE 9 MG/ML
25 INJECTION, SOLUTION INTRAVENOUS PRN
Status: DISCONTINUED | OUTPATIENT
Start: 2021-05-25 | End: 2021-05-25

## 2021-05-25 RX ORDER — DOCUSATE SODIUM 100 MG/1
100 CAPSULE, LIQUID FILLED ORAL 2 TIMES DAILY
Status: DISCONTINUED | OUTPATIENT
Start: 2021-05-25 | End: 2021-05-25

## 2021-05-25 RX ORDER — ONDANSETRON 4 MG/1
8 TABLET, ORALLY DISINTEGRATING ORAL EVERY 8 HOURS PRN
Status: DISCONTINUED | OUTPATIENT
Start: 2021-05-25 | End: 2021-05-27 | Stop reason: HOSPADM

## 2021-05-25 RX ORDER — SODIUM CHLORIDE 0.9 % (FLUSH) 0.9 %
10 SYRINGE (ML) INJECTION EVERY 12 HOURS SCHEDULED
Status: DISCONTINUED | OUTPATIENT
Start: 2021-05-25 | End: 2021-05-25

## 2021-05-25 RX ORDER — SODIUM CHLORIDE 0.9 % (FLUSH) 0.9 %
5-40 SYRINGE (ML) INJECTION EVERY 12 HOURS SCHEDULED
Status: DISCONTINUED | OUTPATIENT
Start: 2021-05-25 | End: 2021-05-27 | Stop reason: HOSPADM

## 2021-05-25 RX ORDER — MODIFIED LANOLIN
OINTMENT (GRAM) TOPICAL PRN
Status: DISCONTINUED | OUTPATIENT
Start: 2021-05-25 | End: 2021-05-27 | Stop reason: HOSPADM

## 2021-05-25 RX ORDER — CHLORHEXIDINE GLUCONATE 4 G/100ML
SOLUTION TOPICAL
Status: DISCONTINUED
Start: 2021-05-25 | End: 2021-05-25

## 2021-05-25 RX ORDER — PENICILLIN G 3000000 [IU]/50ML
3 INJECTION, SOLUTION INTRAVENOUS EVERY 4 HOURS
Status: DISCONTINUED | OUTPATIENT
Start: 2021-05-25 | End: 2021-05-25

## 2021-05-25 RX ORDER — SODIUM CHLORIDE 9 MG/ML
25 INJECTION, SOLUTION INTRAVENOUS PRN
Status: DISCONTINUED | OUTPATIENT
Start: 2021-05-25 | End: 2021-05-27 | Stop reason: HOSPADM

## 2021-05-25 RX ADMIN — DOCUSATE SODIUM 100 MG: 100 CAPSULE, LIQUID FILLED ORAL at 20:34

## 2021-05-25 RX ADMIN — IBUPROFEN 800 MG: 800 TABLET, FILM COATED ORAL at 18:30

## 2021-05-25 RX ADMIN — SODIUM CHLORIDE, POTASSIUM CHLORIDE, SODIUM LACTATE AND CALCIUM CHLORIDE: 600; 310; 30; 20 INJECTION, SOLUTION INTRAVENOUS at 11:50

## 2021-05-25 RX ADMIN — SODIUM CHLORIDE, POTASSIUM CHLORIDE, SODIUM LACTATE AND CALCIUM CHLORIDE: 600; 310; 30; 20 INJECTION, SOLUTION INTRAVENOUS at 08:00

## 2021-05-25 RX ADMIN — PENICILLIN G 3 MILLION UNITS: 3000000 INJECTION, SOLUTION INTRAVENOUS at 12:25

## 2021-05-25 RX ADMIN — Medication 2 MILLI-UNITS/MIN: at 08:30

## 2021-05-25 RX ADMIN — PENICILLIN G 3 MILLION UNITS: 3000000 INJECTION, SOLUTION INTRAVENOUS at 16:12

## 2021-05-25 RX ADMIN — DEXTROSE MONOHYDRATE 5 MILLION UNITS: 5 INJECTION INTRAVENOUS at 08:45

## 2021-05-25 ASSESSMENT — PAIN SCALES - GENERAL
PAINLEVEL_OUTOF10: 0
PAINLEVEL_OUTOF10: 7

## 2021-05-25 ASSESSMENT — PAIN DESCRIPTION - DESCRIPTORS: DESCRIPTORS: CRAMPING;DISCOMFORT

## 2021-05-25 NOTE — PROGRESS NOTES
updated by phone in office  of patients labor status, Pitocin rate, Contractions, reactive FHR tracing. No new orders at this time.

## 2021-05-25 NOTE — PROGRESS NOTES
Second SALVADOR Morales RN verified EFM tracing and Level 1 tracing prior to beginning Pitocin induction

## 2021-05-25 NOTE — PROGRESS NOTES
Continues to tolerate Pitocin induction of labor well. No request for analgesia at this time though states level of intensity increasing now. PO clear liquids given.

## 2021-05-25 NOTE — L&D DELIVERY NOTE
Department of Obstetrics and Gynecology  Spontaneous Vaginal Delivery Note         Pre-operative Diagnosis:  Term pregnancy, Induced labor, Single fetus and Uncomplicated pregnancy    Post-operative Diagnosis:  Same + live female wt 7 #,5 oz  Procedure:  Spontaneous vaginal delivery    Surgeon:  Nya Gordon MD    Anesthesia:  none    Estimated blood loss:  350ml    Specimen:  Placenta not sent to pathology     Cord blood sent Yes    Complications:  none    Condition:  infant stable to general nursery and mother stable    Details of Procedure: The patient is a 27 y.o. female at 44w3d   OB History        9    Para   5    Term   5       0    AB   2    Living   5       SAB   1    TAB   0    Ectopic   0    Molar   0    Multiple   0    Live Births   5             who was admitted for early latent labor. She received the following interventions: ARBOW and IV Pitocin induction She was known to be GBS positive and did receive antibiotic prophylaxis. The patient progressed well,did not receive an epidural, became complete and started to push. After pushing for 2 x the fetal head was at the perineum, nose and mouth suctioned with bulb suction and the rest of the infant delivered atraumatically, placed on mother abdomen. Cord was clamped and cut and infant handed off to the waiting nurse for evaluation. The delivery of the placenta was spontaneous. The perineum and vagina were explored and no tears or lacerations were noted.

## 2021-05-25 NOTE — PROGRESS NOTES
Patient instructed to push with contractions now with RN at bedside to continually to assist patient to push and assessing FHR and contractions [id number] during second stage of labor.   in room to attend delivery

## 2021-05-25 NOTE — PROGRESS NOTES
28 yo  EDC -24 -21 40 1/7 weeks gestation here for scheduled induction of labor for Postdates. No c/o on arrival to unit and states baby has been moving well but has not moved much yet this morning. Denies Hx of prenatal or medical problems and in good spirits. Oriented toLabor Room 3. Changed into gown and voided QS with urine specimen obtained. Assisted into bed with EFM applied. Call bell in reach.

## 2021-05-25 NOTE — PROGRESS NOTES
Tolerating contractions well thus far-contractions mild at this time. IV Pitocin induction continues.

## 2021-05-25 NOTE — H&P
Department of Obstetrics and Gynecology  [facility] History and Physical        CHIEF COMPLAINT:  IOL     HISTORY OF PRESENT ILLNESS:    The patient is a 27 y.o. female [id number], Patient's last menstrual period was 2020.,  at 40w1d. OB History        9    Para   5    Term   5       0    AB   2    Living   5       SAB   1    TAB   0    Ectopic   0    Molar   0    Multiple   0    Live Births   5            Patient presented this AM for induction of labor. Called for AROM. Pt desires NCB. Estimated Due Date: Estimated Date of Delivery: 21    PRENATAL CARE:    Complicated by:   Patient Active Problem List   Diagnosis Code    Encounter for fetal anatomic survey Z36.89    Normal labor O80, Z37.9    Normal labor and delivery O80    Normal labor O80, Z37.9    Missed  O02.1    Pyelonephritis N12    UTI (urinary tract infection) in pregnancy in second trimester O23.42    Term pregnancy Z34.90       PAST OB HISTORY  OB History        9    Para   5    Term   5       0    AB   2    Living   5       SAB   1    TAB   0    Ectopic   0    Molar   0    Multiple   0    Live Births   5                Past Medical History:        Diagnosis Date    Eczema     Environmental allergies     Missed  10/24/2019     Past Surgical History:        Procedure Laterality Date    DILATION AND CURETTAGE OF UTERUS N/A 10/25/2019    SUCTION DILATATION AND CURETTAGE performed by Humberto Crawley MD at [facility] 4 History:    TOBACCO:   reports that she is a non-smoker but has been exposed to tobacco smoke. She has never used smokeless tobacco.  ETOH:   reports no history of alcohol use. DRUGS:   reports no history of drug use. Family History:   No family history on file. Medications Prior to Admission:  Medications Prior to Admission: polyethylene glycol (MIRALAX) POWD powder, Take 17 g by mouth daily  Allergies:  Patient has no known allergies.     Review of Systems:   Ears,

## 2021-05-25 NOTE — PROGRESS NOTES
Spontaneous vaginal delivery viable female by  at 1653pm. Strong cry at delivery. Baby alert, active and moving all extremities well. Baby placed skin to skin on mothers chest after delivery with instructions given on correct positioning and assurance of non-obstructed airway at all times.

## 2021-05-25 NOTE — PROGRESS NOTES
Phone call from -informed patient 4cm now. Orders to Have DAVID Armendariz rupture patients membranes. Amniotomy done with return of moderate clear fluid-yelitza care given.

## 2021-05-25 NOTE — PROGRESS NOTES
Dozing at this time and tolerating Pitocin induction of labor with no voiced c/o's. Support person attentive at the bedside.

## 2021-05-25 NOTE — PROGRESS NOTES
Baby skin to skin with mother. Breastfeeding well with good latch to nipple by baby and good sucking observed. Patient c/o mild cramping. PO fluids provided and general diet menu given. Support person attentive at bedside.

## 2021-05-25 NOTE — PROGRESS NOTES
IV Pitocin induction of labor initiated with RN assessing FHR and contractions [id number] while IV Pitocin infusing in first stage of labor.

## 2021-05-26 LAB
HCT VFR BLD CALC: 31.7 % (ref 34–48)
HEMOGLOBIN: 10.2 G/DL (ref 11.5–15.5)

## 2021-05-26 PROCEDURE — 6370000000 HC RX 637 (ALT 250 FOR IP): Performed by: OBSTETRICS & GYNECOLOGY

## 2021-05-26 PROCEDURE — 85018 HEMOGLOBIN: CPT

## 2021-05-26 PROCEDURE — 85014 HEMATOCRIT: CPT

## 2021-05-26 PROCEDURE — 1220000001 HC SEMI PRIVATE L&D R&B

## 2021-05-26 PROCEDURE — 36415 COLL VENOUS BLD VENIPUNCTURE: CPT

## 2021-05-26 RX ADMIN — OXYCODONE HYDROCHLORIDE AND ACETAMINOPHEN 2 TABLET: 5; 325 TABLET ORAL at 10:52

## 2021-05-26 RX ADMIN — IBUPROFEN 800 MG: 800 TABLET, FILM COATED ORAL at 16:09

## 2021-05-26 RX ADMIN — OXYCODONE HYDROCHLORIDE AND ACETAMINOPHEN 2 TABLET: 5; 325 TABLET ORAL at 17:48

## 2021-05-26 RX ADMIN — DOCUSATE SODIUM 100 MG: 100 CAPSULE, LIQUID FILLED ORAL at 08:22

## 2021-05-26 RX ADMIN — IBUPROFEN 800 MG: 800 TABLET, FILM COATED ORAL at 02:29

## 2021-05-26 RX ADMIN — OXYCODONE HYDROCHLORIDE AND ACETAMINOPHEN 1 TABLET: 5; 325 TABLET ORAL at 05:50

## 2021-05-26 RX ADMIN — IBUPROFEN 800 MG: 800 TABLET, FILM COATED ORAL at 22:32

## 2021-05-26 RX ADMIN — Medication: at 08:21

## 2021-05-26 RX ADMIN — IBUPROFEN 800 MG: 800 TABLET, FILM COATED ORAL at 08:47

## 2021-05-26 RX ADMIN — DOCUSATE SODIUM 100 MG: 100 CAPSULE, LIQUID FILLED ORAL at 20:26

## 2021-05-26 ASSESSMENT — PAIN SCALES - GENERAL
PAINLEVEL_OUTOF10: 6
PAINLEVEL_OUTOF10: 0
PAINLEVEL_OUTOF10: 9
PAINLEVEL_OUTOF10: 3
PAINLEVEL_OUTOF10: 7

## 2021-05-26 ASSESSMENT — PAIN DESCRIPTION - DESCRIPTORS: DESCRIPTORS: CRAMPING;SHARP

## 2021-05-26 NOTE — PROGRESS NOTES
Father of baby found asleep with , reviewed safe sleep practices with pt again. Offered to take  to the nursery. Pt stated that she is up now and she is going to feed the baby.

## 2021-05-26 NOTE — PROGRESS NOTES
Hearing screening results were discussed with parent. Questions answered. Brochure given to parent. Advised to monitor developmental milestones and contact physician for any concerns.    Electronically signed by Kaylin Sweeney on 5/26/2021 at 11:14 AM

## 2021-05-26 NOTE — PROGRESS NOTES
Department of Obstetrics and Gynecology  Labor and Delivery  Attending Post Partum Progress Note      SUBJECTIVE:  Doing well with no complaints  OBJECTIVE:      Vitals:  BP (!) 119/55   Pulse 75   Temp 98.7 °F (37.1 °C) (Oral)   Resp 18   Ht 5' 6\" (1.676 m)   Wt 205 lb (93 kg)   LMP 08/17/2020   Breastfeeding Unknown   BMI 33.09 kg/m²     ABDOMEN:  Soft, well contracted uterus   Lochia: Normal  No calf tenderness    DATA:    CBC:    Lab Results   Component Value Date    WBC 6.7 05/25/2021    RBC 3.73 05/25/2021    HGB 10.2 05/26/2021    HCT 31.7 05/26/2021    MCV 84.7 05/25/2021    RDW 13.8 05/25/2021     05/25/2021       ASSESSMENT & PLAN:      PPD # 1    Continue current care. Discharge home tomorrow.

## 2021-05-26 NOTE — PROGRESS NOTES
RN assuming care of patient. VSS. Afebrile. Moderate c/o discomfort presently on primarily Rt. Side of abdomen in addition to achiness and cramping. Patient requesting analgesia for relief of pain though medications not due yet-patient verbalized understanding and agreeable to wait for meds when due. Fundus firm with moderate rubra observed on yelitza pad. States is voiding without difficulty. Plan of care discussed for day-patient agreeable. Support person attentive at bedside. Call bell in reach.

## 2021-05-26 NOTE — PLAN OF CARE
Problem: Pain - Acute:  Goal: Pain level will decrease  Description: Pain level will decrease  Outcome: Met This Shift     Problem: Urinary Retention:  Goal: Urinary elimination within specified parameters  Description: Urinary elimination within specified parameters  Outcome: Met This Shift     Problem: Breastfeeding - Ineffective:  Goal: Infant able to latch onto breast  Description: Infant able to latch onto breast  Outcome: Met This Shift     Problem: Pain:  Goal: Pain level will decrease  Description: Pain level will decrease  Outcome: Met This Shift

## 2021-05-27 VITALS
RESPIRATION RATE: 16 BRPM | SYSTOLIC BLOOD PRESSURE: 105 MMHG | HEART RATE: 95 BPM | TEMPERATURE: 98.6 F | DIASTOLIC BLOOD PRESSURE: 65 MMHG | BODY MASS INDEX: 32.95 KG/M2 | HEIGHT: 66 IN | WEIGHT: 205 LBS

## 2021-05-27 PROCEDURE — 6370000000 HC RX 637 (ALT 250 FOR IP): Performed by: OBSTETRICS & GYNECOLOGY

## 2021-05-27 RX ORDER — IBUPROFEN 800 MG/1
800 TABLET ORAL EVERY 6 HOURS PRN
Qty: 120 TABLET | Refills: 0 | Status: SHIPPED | OUTPATIENT
Start: 2021-05-27 | End: 2022-08-10

## 2021-05-27 RX ADMIN — OXYCODONE HYDROCHLORIDE AND ACETAMINOPHEN 2 TABLET: 5; 325 TABLET ORAL at 07:13

## 2021-05-27 RX ADMIN — DOCUSATE SODIUM 100 MG: 100 CAPSULE, LIQUID FILLED ORAL at 07:28

## 2021-05-27 ASSESSMENT — PAIN SCALES - GENERAL
PAINLEVEL_OUTOF10: 10
PAINLEVEL_OUTOF10: 0

## 2021-05-27 NOTE — DISCHARGE SUMMARY
Obstetrical Discharge Form         Patients Name  Bassem Gregory    Gestational Age:  44w3d    Antepartum complications: post-term    Date of Delivery:   2021       4:53 PM      Type of Delivery:   Vaginal, Spontaneous [250]   Rupture Date/time:    No data found      No data found     Presentation:    Vertex [1]   Position:                      Anesthesia:    None [250]     Feeding method:         Delivered By:   BATOOL Llamas     Baby:       Information for the patient's :  Azra Espinoza [[id number]]          Intrapartum complications: None  Postpartum complications: none  Discharge Date:   2021  Discharge Condition: Stable  Disposition:   Home    Plan:   Follow up    in 6 weeks

## 2021-05-27 NOTE — PROGRESS NOTES
Discharge instructions given. Verbalizes understanding.  Patient left floor with belongings and baby in car seat ambulating to discharge

## 2021-07-21 ENCOUNTER — HOSPITAL ENCOUNTER (EMERGENCY)
Age: 26
Discharge: HOME | End: 2021-07-21
Attending: EMERGENCY MEDICINE
Payer: COMMERCIAL

## 2021-07-21 VITALS
RESPIRATION RATE: 16 BRPM | TEMPERATURE: 97.1 F | WEIGHT: 180 LBS | DIASTOLIC BLOOD PRESSURE: 75 MMHG | BODY MASS INDEX: 28.93 KG/M2 | OXYGEN SATURATION: 100 % | HEIGHT: 66 IN | HEART RATE: 82 BPM | SYSTOLIC BLOOD PRESSURE: 110 MMHG

## 2021-07-21 DIAGNOSIS — L30.9 ECZEMA, UNSPECIFIED TYPE: Primary | ICD-10-CM

## 2021-07-21 PROCEDURE — 99282 EMERGENCY DEPT VISIT SF MDM: CPT

## 2021-07-21 RX ORDER — DIAPER,BRIEF,INFANT-TODD,DISP
EACH MISCELLANEOUS
Qty: 1 TUBE | Refills: 1 | Status: SHIPPED | OUTPATIENT
Start: 2021-07-21 | End: 2021-07-28

## 2021-10-25 ENCOUNTER — HOSPITAL ENCOUNTER (EMERGENCY)
Age: 26
Discharge: HOME | End: 2021-10-25
Payer: COMMERCIAL

## 2021-10-25 VITALS
SYSTOLIC BLOOD PRESSURE: 131 MMHG | HEART RATE: 94 BPM | BODY MASS INDEX: 29.05 KG/M2 | TEMPERATURE: 97.2 F | DIASTOLIC BLOOD PRESSURE: 56 MMHG | WEIGHT: 180 LBS | RESPIRATION RATE: 16 BRPM | OXYGEN SATURATION: 99 %

## 2021-10-25 NOTE — ED NOTES
[facility] CONTACT ASSESSMENT NOTE                                                                                                Department of Emergency Medicine                                                      First Provider Note  10/25/21  2:14 PM EDT  NAME: Sahra Inman  : 1995  MRN: [id number]    Chief Complaint: Nausea (upper gastric, -vomiting)      History of Present Illness:   Sahra Inman is a 27 y.o. female who presents to the ED for abdominal pain, nausea. Focused Physical Exam:  VS:    ED Triage Vitals [10/25/21 1406]   BP Temp Temp Source Pulse Resp SpO2 Height Weight   -- 97.2 °F (36.2 °C) Tympanic 94 -- 99 % -- --        General: Alert and in no apparent distress. Medical History:  has a past medical history of Eczema, Environmental allergies, Missed , and  (spontaneous vaginal delivery). Surgical History:  has a past surgical history that includes Dilation and curettage of uterus (N/A, 10/25/2019). Social History:  reports that she is a non-smoker but has been exposed to tobacco smoke. She has never used smokeless tobacco. She reports that she does not drink alcohol and does not use drugs. Family History: family history is not on file. Allergies: Patient has no known allergies.      Initial Plan of Care:  Initiate Treatment-Testing, Proceed toTreatment Area When Bed Available for ED Attending/MLP to Continue Care    -------------------------------------------------END OF FIRST PROVIDER CONTACT ASSESSMENT NOTE--------------------------------------------------------  Electronically signed by SUMANTH Rivera   DD: 10/25/21       SUMANTH Rivera  10/25/21 2494

## 2021-10-26 ENCOUNTER — APPOINTMENT (OUTPATIENT)
Dept: GENERAL RADIOLOGY | Age: 26
End: 2021-10-26
Payer: COMMERCIAL

## 2021-10-26 ENCOUNTER — HOSPITAL ENCOUNTER (EMERGENCY)
Age: 26
Discharge: HOME | End: 2021-10-26
Attending: EMERGENCY MEDICINE
Payer: COMMERCIAL

## 2021-10-26 VITALS
WEIGHT: 180 LBS | RESPIRATION RATE: 16 BRPM | HEART RATE: 88 BPM | TEMPERATURE: 96.9 F | BODY MASS INDEX: 28.25 KG/M2 | SYSTOLIC BLOOD PRESSURE: 127 MMHG | OXYGEN SATURATION: 99 % | HEIGHT: 67 IN | DIASTOLIC BLOOD PRESSURE: 74 MMHG

## 2021-10-26 DIAGNOSIS — K59.00 CONSTIPATION, UNSPECIFIED CONSTIPATION TYPE: ICD-10-CM

## 2021-10-26 DIAGNOSIS — R10.84 GENERALIZED ABDOMINAL PAIN: Primary | ICD-10-CM

## 2021-10-26 LAB
ALBUMIN SERPL-MCNC: 4.5 G/DL (ref 3.5–5.2)
ALP BLD-CCNC: 95 U/L (ref 35–104)
ALT SERPL-CCNC: 17 U/L (ref 0–32)
ANION GAP SERPL CALCULATED.3IONS-SCNC: 8 MMOL/L (ref 7–16)
AST SERPL-CCNC: 17 U/L (ref 0–31)
BASOPHILS ABSOLUTE: 0.03 E9/L (ref 0–0.2)
BASOPHILS RELATIVE PERCENT: 0.4 % (ref 0–2)
BILIRUB SERPL-MCNC: <0.2 MG/DL (ref 0–1.2)
BILIRUBIN URINE: NEGATIVE
BLOOD, URINE: NEGATIVE
BUN BLDV-MCNC: 7 MG/DL (ref 6–20)
CALCIUM SERPL-MCNC: 10 MG/DL (ref 8.6–10.2)
CHLORIDE BLD-SCNC: 104 MMOL/L (ref 98–107)
CLARITY: CLEAR
CO2: 28 MMOL/L (ref 22–29)
COLOR: YELLOW
CREAT SERPL-MCNC: 0.7 MG/DL (ref 0.5–1)
EOSINOPHILS ABSOLUTE: 0.15 E9/L (ref 0.05–0.5)
EOSINOPHILS RELATIVE PERCENT: 2 % (ref 0–6)
GFR AFRICAN AMERICAN: >60
GFR NON-AFRICAN AMERICAN: >60 ML/MIN/1.73
GLUCOSE BLD-MCNC: 90 MG/DL (ref 74–99)
GLUCOSE URINE: NEGATIVE MG/DL
HCG(URINE) PREGNANCY TEST: NEGATIVE
HCT VFR BLD CALC: 41.2 % (ref 34–48)
HEMOGLOBIN: 13.7 G/DL (ref 11.5–15.5)
IMMATURE GRANULOCYTES #: 0.02 E9/L
IMMATURE GRANULOCYTES %: 0.3 % (ref 0–5)
KETONES, URINE: NEGATIVE MG/DL
LEUKOCYTE ESTERASE, URINE: NEGATIVE
LIPASE: 31 U/L (ref 13–60)
LYMPHOCYTES ABSOLUTE: 3.38 E9/L (ref 1.5–4)
LYMPHOCYTES RELATIVE PERCENT: 45.7 % (ref 20–42)
MCH RBC QN AUTO: 30.3 PG (ref 26–35)
MCHC RBC AUTO-ENTMCNC: 33.3 % (ref 32–34.5)
MCV RBC AUTO: 91.2 FL (ref 80–99.9)
MONOCYTES ABSOLUTE: 0.68 E9/L (ref 0.1–0.95)
MONOCYTES RELATIVE PERCENT: 9.2 % (ref 2–12)
NEUTROPHILS ABSOLUTE: 3.14 E9/L (ref 1.8–7.3)
NEUTROPHILS RELATIVE PERCENT: 42.4 % (ref 43–80)
NITRITE, URINE: NEGATIVE
PDW BLD-RTO: 12.7 FL (ref 11.5–15)
PH UA: 6 (ref 5–9)
PLATELET # BLD: 381 E9/L (ref 130–450)
PMV BLD AUTO: 9.8 FL (ref 7–12)
POTASSIUM SERPL-SCNC: 4 MMOL/L (ref 3.5–5)
PROTEIN UA: NEGATIVE MG/DL
RBC # BLD: 4.52 E12/L (ref 3.5–5.5)
SODIUM BLD-SCNC: 140 MMOL/L (ref 132–146)
SPECIFIC GRAVITY UA: 1.02 (ref 1–1.03)
TOTAL PROTEIN: 7.5 G/DL (ref 6.4–8.3)
UROBILINOGEN, URINE: 0.2 E.U./DL
WBC # BLD: 7.4 E9/L (ref 4.5–11.5)

## 2021-10-26 PROCEDURE — 96374 THER/PROPH/DIAG INJ IV PUSH: CPT

## 2021-10-26 PROCEDURE — 85025 COMPLETE CBC W/AUTO DIFF WBC: CPT

## 2021-10-26 PROCEDURE — 80053 COMPREHEN METABOLIC PANEL: CPT

## 2021-10-26 PROCEDURE — 6360000002 HC RX W HCPCS: Performed by: EMERGENCY MEDICINE

## 2021-10-26 PROCEDURE — 81003 URINALYSIS AUTO W/O SCOPE: CPT

## 2021-10-26 PROCEDURE — 81025 URINE PREGNANCY TEST: CPT

## 2021-10-26 PROCEDURE — 99283 EMERGENCY DEPT VISIT LOW MDM: CPT

## 2021-10-26 PROCEDURE — 74022 RADEX COMPL AQT ABD SERIES: CPT

## 2021-10-26 PROCEDURE — 96375 TX/PRO/DX INJ NEW DRUG ADDON: CPT

## 2021-10-26 PROCEDURE — 83690 ASSAY OF LIPASE: CPT

## 2021-10-26 PROCEDURE — 36415 COLL VENOUS BLD VENIPUNCTURE: CPT

## 2021-10-26 RX ORDER — MAGNESIUM CARB/ALUMINUM HYDROX 105-160MG
150 TABLET,CHEWABLE ORAL DAILY
Qty: 300 ML | Refills: 0 | Status: SHIPPED | OUTPATIENT
Start: 2021-10-26 | End: 2021-10-28

## 2021-10-26 RX ORDER — ONDANSETRON 2 MG/ML
4 INJECTION INTRAMUSCULAR; INTRAVENOUS ONCE
Status: COMPLETED | OUTPATIENT
Start: 2021-10-26 | End: 2021-10-26

## 2021-10-26 RX ORDER — KETOROLAC TROMETHAMINE 30 MG/ML
30 INJECTION, SOLUTION INTRAMUSCULAR; INTRAVENOUS ONCE
Status: COMPLETED | OUTPATIENT
Start: 2021-10-26 | End: 2021-10-26

## 2021-10-26 RX ADMIN — ONDANSETRON 4 MG: 2 INJECTION INTRAMUSCULAR; INTRAVENOUS at 22:50

## 2021-10-26 RX ADMIN — KETOROLAC TROMETHAMINE 30 MG: 30 INJECTION, SOLUTION INTRAMUSCULAR; INTRAVENOUS at 22:51

## 2021-10-26 ASSESSMENT — ENCOUNTER SYMPTOMS
WHEEZING: 0
EYE PAIN: 0
SORE THROAT: 0
SINUS PRESSURE: 0
EYE DISCHARGE: 0
COUGH: 0
EYE REDNESS: 0
DIARRHEA: 0
CONSTIPATION: 1
BACK PAIN: 0
ABDOMINAL PAIN: 1
ABDOMINAL DISTENTION: 0
NAUSEA: 1
SHORTNESS OF BREATH: 0
VOMITING: 0

## 2021-10-26 ASSESSMENT — PAIN SCALES - GENERAL
PAINLEVEL_OUTOF10: 7
PAINLEVEL_OUTOF10: 6

## 2021-10-26 ASSESSMENT — PAIN DESCRIPTION - LOCATION: LOCATION: ABDOMEN

## 2021-10-26 ASSESSMENT — PAIN DESCRIPTION - PAIN TYPE: TYPE: ACUTE PAIN

## 2021-10-26 ASSESSMENT — PAIN DESCRIPTION - ORIENTATION: ORIENTATION: MID

## 2021-10-27 NOTE — ED PROVIDER NOTES
Patient is a 26 y/o female who presents to the ED with abdominal pain and constipation. Patient states that she has irregular bowel movements and feels constipated. She states that her stool is soft, however, only a small amount comes out. Her last bowel movement was today. She reports mid abdominal pain. Currently, her pain is 6/10. She is nauseated but denies any vomiting. She denies any fever, diarrhea, dysuria or gross hematuria. Review of Systems   Constitutional: Negative for chills and fever. HENT: Negative for ear pain, sinus pressure and sore throat. Eyes: Negative for pain, discharge and redness. Respiratory: Negative for cough, shortness of breath and wheezing. Cardiovascular: Negative for chest pain. Gastrointestinal: Positive for abdominal pain, constipation and nausea. Negative for abdominal distention, diarrhea and vomiting. Genitourinary: Negative for dysuria and frequency. Musculoskeletal: Negative for arthralgias and back pain. Skin: Negative for rash and wound. Neurological: Negative for weakness and headaches. Hematological: Negative for adenopathy. All other systems reviewed and are negative. Physical Exam  Vitals and nursing note reviewed. Constitutional:       General: She is not in acute distress. HENT:      Head: Normocephalic and atraumatic. Mouth/Throat:      Mouth: Mucous membranes are moist.   Eyes:      Pupils: Pupils are equal, round, and reactive to light. Cardiovascular:      Rate and Rhythm: Normal rate and regular rhythm. Heart sounds: No murmur heard. Pulmonary:      Effort: Pulmonary effort is normal. No respiratory distress. Breath sounds: Normal breath sounds. No stridor. No wheezing, rhonchi or rales. Abdominal:      General: Abdomen is protuberant. Bowel sounds are normal.      Palpations: Abdomen is soft. Tenderness: There is no abdominal tenderness. There is no guarding.  Negative signs include Ruib's sign and McBurney's sign. Skin:     General: Skin is warm and dry. Findings: No rash. Neurological:      Mental Status: She is alert and oriented to person, place, and time. Procedures     [facility]             --------------------------------------------- PAST HISTORY ---------------------------------------------  Past Medical History:  has a past medical history of Eczema, Environmental allergies, Missed , and  (spontaneous vaginal delivery). Past Surgical History:  has a past surgical history that includes Dilation and curettage of uterus (N/A, 10/25/2019). Social History:  reports that she is a non-smoker but has been exposed to tobacco smoke. She has never used smokeless tobacco. She reports that she does not drink alcohol and does not use drugs. Family History: family history is not on file. The patients home medications have been reviewed. Allergies: Patient has no known allergies.     -------------------------------------------------- RESULTS -------------------------------------------------  Labs:  Results for orders placed or performed during the hospital encounter of 10/26/21   Urinalysis   Result Value Ref Range    Color, UA Yellow Straw/Yellow    Clarity, UA Clear Clear    Glucose, Ur Negative Negative mg/dL    Bilirubin Urine Negative Negative    Ketones, Urine Negative Negative mg/dL    Specific Gravity, UA 1.020 1.005 - 1.030    Blood, Urine Negative Negative    pH, UA 6.0 5.0 - 9.0    Protein, UA Negative Negative mg/dL    Urobilinogen, Urine 0.2 <2.0 E.U./dL    Nitrite, Urine Negative Negative    Leukocyte Esterase, Urine Negative Negative   CBC auto differential   Result Value Ref Range    WBC 7.4 4.5 - 11.5 E9/L    RBC 4.52 3.50 - 5.50 E12/L    Hemoglobin 13.7 11.5 - 15.5 g/dL    Hematocrit 41.2 34.0 - 48.0 %    MCV 91.2 80.0 - 99.9 fL    MCH 30.3 26.0 - 35.0 pg    MCHC 33.3 32.0 - 34.5 %    RDW 12.7 11.5 - 15.0 fL    Platelets 555 933 - 297 E9/L    MPV 9.8 7.0 - 12.0 fL    Neutrophils % 42.4 (L) 43.0 - 80.0 %    Immature Granulocytes % 0.3 0.0 - 5.0 %    Lymphocytes % 45.7 (H) 20.0 - 42.0 %    Monocytes % 9.2 2.0 - 12.0 %    Eosinophils % 2.0 0.0 - 6.0 %    Basophils % 0.4 0.0 - 2.0 %    Neutrophils Absolute 3.14 1.80 - 7.30 E9/L    Immature Granulocytes # 0.02 E9/L    Lymphocytes Absolute 3.38 1.50 - 4.00 E9/L    Monocytes Absolute 0.68 0.10 - 0.95 E9/L    Eosinophils Absolute 0.15 0.05 - 0.50 E9/L    Basophils Absolute 0.03 0.00 - 0.20 E9/L   Comprehensive Metabolic Panel   Result Value Ref Range    Sodium 140 132 - 146 mmol/L    Potassium 4.0 3.5 - 5.0 mmol/L    Chloride 104 98 - 107 mmol/L    CO2 28 22 - 29 mmol/L    Anion Gap 8 7 - 16 mmol/L    Glucose 90 74 - 99 mg/dL    BUN 7 6 - 20 mg/dL    CREATININE 0.7 0.5 - 1.0 mg/dL    GFR Non-African American >60 >=60 mL/min/1.73    GFR African American >60     Calcium 10.0 8.6 - 10.2 mg/dL    Total Protein 7.5 6.4 - 8.3 g/dL    Albumin 4.5 3.5 - 5.2 g/dL    Total Bilirubin <0.2 0.0 - 1.2 mg/dL    Alkaline Phosphatase 95 35 - 104 U/L    ALT 17 0 - 32 U/L    AST 17 0 - 31 U/L   Lipase   Result Value Ref Range    Lipase 31 13 - 60 U/L   Pregnancy, urine   Result Value Ref Range    HCG(Urine) Pregnancy Test NEGATIVE NEGATIVE       Radiology:  XR ACUTE ABD SERIES CHEST 1 VW   Final Result   1. No acute cardiopulmonary pathology. 2.  Mild stool burden in the colon. Otherwise unremarkable abdominal   radiographs.             ------------------------- NURSING NOTES AND VITALS REVIEWED ---------------------------  Date / Time Roomed:  10/26/2021  9:30 PM  ED Bed Assignment:  [id number]    The nursing notes within the ED encounter and vital signs as below have been reviewed.    /74   Pulse 88   Temp 96.9 °F (36.1 °C) (Temporal)   Resp 16   Ht 5' 7\" (1.702 m)   Wt 180 lb (81.6 kg)   LMP 10/04/2021   SpO2 99%   BMI 28.19 kg/m²   Oxygen Saturation Interpretation: Normal      ------------------------------------------ PROGRESS NOTES ------------------------------------------  I have spoken with the patient and discussed todays results, in addition to providing specific details for the plan of care and counseling regarding the diagnosis and prognosis. Their questions are answered at this time and they are agreeable with the plan. I discussed at length with them reasons for immediate return here for re evaluation. They will followup with primary care by calling their office tomorrow. --------------------------------- ADDITIONAL PROVIDER NOTES ---------------------------------  At this time the patient is without objective evidence of an acute process requiring hospitalization or inpatient management. They have remained hemodynamically stable throughout their entire ED visit and are stable for discharge with outpatient follow-up. The plan has been discussed in detail and they are aware of the specific conditions for emergent return, as well as the importance of follow-up. New Prescriptions    MAGNESIUM CITRATE 1.745 GM/30ML SOLUTION    Take 150 mLs by mouth daily for 2 days 150ML PO QD AS NEEDED FOR CONSTIPATION. Diagnosis:  1. Generalized abdominal pain    2. Constipation, unspecified constipation type        Disposition:  Patient's disposition: Discharge to home  Patient's condition is stable.          [facility],   10/26/21 4571

## 2021-11-08 ENCOUNTER — HOSPITAL ENCOUNTER (EMERGENCY)
Age: 26
Discharge: HOME | End: 2021-11-08
Attending: EMERGENCY MEDICINE
Payer: COMMERCIAL

## 2021-11-08 VITALS
TEMPERATURE: 96.8 F | WEIGHT: 180 LBS | BODY MASS INDEX: 28.93 KG/M2 | HEIGHT: 66 IN | SYSTOLIC BLOOD PRESSURE: 110 MMHG | HEART RATE: 88 BPM | RESPIRATION RATE: 16 BRPM | DIASTOLIC BLOOD PRESSURE: 74 MMHG | OXYGEN SATURATION: 98 %

## 2021-11-08 DIAGNOSIS — H69.80 DYSFUNCTION OF EUSTACHIAN TUBE, UNSPECIFIED LATERALITY: Primary | ICD-10-CM

## 2021-11-08 PROCEDURE — 99282 EMERGENCY DEPT VISIT SF MDM: CPT

## 2021-11-08 RX ORDER — CETIRIZINE HYDROCHLORIDE, PSEUDOEPHEDRINE HYDROCHLORIDE 5; 120 MG/1; MG/1
1 TABLET, FILM COATED, EXTENDED RELEASE ORAL 2 TIMES DAILY
Qty: 60 TABLET | Refills: 0 | Status: SHIPPED | OUTPATIENT
Start: 2021-11-08 | End: 2021-12-08

## 2021-11-08 ASSESSMENT — ENCOUNTER SYMPTOMS
VOMITING: 0
SHORTNESS OF BREATH: 0
EYE PAIN: 0
DIARRHEA: 0
EYE REDNESS: 0
NAUSEA: 0
COUGH: 0
SORE THROAT: 0
EYE DISCHARGE: 0
BACK PAIN: 0
SINUS PRESSURE: 0
WHEEZING: 0
ABDOMINAL DISTENTION: 0

## 2021-11-08 ASSESSMENT — PAIN DESCRIPTION - ORIENTATION: ORIENTATION: RIGHT;LEFT

## 2021-11-08 ASSESSMENT — PAIN DESCRIPTION - ONSET: ONSET: SUDDEN

## 2021-11-08 ASSESSMENT — PAIN DESCRIPTION - PROGRESSION: CLINICAL_PROGRESSION: NOT CHANGED

## 2021-11-08 ASSESSMENT — PAIN DESCRIPTION - DESCRIPTORS: DESCRIPTORS: ACHING;CONSTANT

## 2021-11-08 ASSESSMENT — PAIN DESCRIPTION - FREQUENCY: FREQUENCY: CONTINUOUS

## 2021-11-08 ASSESSMENT — PAIN DESCRIPTION - PAIN TYPE: TYPE: ACUTE PAIN

## 2021-11-08 ASSESSMENT — PAIN SCALES - GENERAL: PAINLEVEL_OUTOF10: 5

## 2021-11-08 ASSESSMENT — PAIN DESCRIPTION - LOCATION: LOCATION: EAR

## 2021-11-08 NOTE — ED PROVIDER NOTES
The history is provided by the patient. Ear Problem  Location:  Bilateral  Behind ear:  No abnormality  Quality:  Aching and pressure  Severity:  Mild  Onset quality:  Gradual  Timing:  Constant  Progression:  Waxing and waning  Chronicity:  Recurrent  Worsened by:  Nothing  Ineffective treatments:  None tried  Associated symptoms: no cough, no diarrhea, no fever, no headaches, no rash, no sore throat and no vomiting         Review of Systems   Constitutional: Negative for chills and fever. HENT: Positive for ear pain. Negative for sinus pressure and sore throat. Eyes: Negative for pain, discharge and redness. Respiratory: Negative for cough, shortness of breath and wheezing. Cardiovascular: Negative for chest pain. Gastrointestinal: Negative for abdominal distention, diarrhea, nausea and vomiting. Genitourinary: Negative for dysuria and frequency. Musculoskeletal: Negative for arthralgias and back pain. Skin: Negative for rash and wound. Neurological: Negative for weakness and headaches. Hematological: Negative for adenopathy. Psychiatric/Behavioral: Negative. All other systems reviewed and are negative. Physical Exam  Vitals and nursing note reviewed. Constitutional:       Appearance: She is well-developed. HENT:      Head: Normocephalic and atraumatic. Right Ear: A middle ear effusion is present. Left Ear: A middle ear effusion is present. Eyes:      Pupils: Pupils are equal, round, and reactive to light. Cardiovascular:      Rate and Rhythm: Normal rate and regular rhythm. Heart sounds: Normal heart sounds. No murmur heard. Pulmonary:      Effort: Pulmonary effort is normal.      Breath sounds: Normal breath sounds. Abdominal:      General: Bowel sounds are normal.      Palpations: Abdomen is soft. Tenderness: There is no abdominal tenderness. There is no guarding or rebound.    Musculoskeletal:      Cervical back: Normal range of motion and neck supple. Skin:     General: Skin is warm and dry. Neurological:      Mental Status: She is alert and oriented to person, place, and time. Psychiatric:         Behavior: Behavior normal.         Thought Content: Thought content normal.         Judgment: Judgment normal.        --------------------------------------------- PAST HISTORY ---------------------------------------------  Past Medical History:  has a past medical history of Eczema, Environmental allergies, Missed , and  (spontaneous vaginal delivery). Past Surgical History:  has a past surgical history that includes Dilation and curettage of uterus (N/A, 10/25/2019). Social History:  reports that she is a non-smoker but has been exposed to tobacco smoke. She has never used smokeless tobacco. She reports that she does not drink alcohol and does not use drugs. Family History: family history is not on file. The patients home medications have been reviewed. Allergies: Patient has no known allergies. -------------------------------------------------- RESULTS -------------------------------------------------  No results found for this visit on 21. No orders to display       ------------------------- NURSING NOTES AND VITALS REVIEWED ---------------------------   The nursing notes within the ED encounter and vital signs as below have been reviewed. /74   Pulse 88   Temp 96.8 °F (36 °C) (Temporal)   Resp 16   Ht 5' 6\" (1.676 m)   Wt 180 lb (81.6 kg)   LMP 2021   SpO2 98%   BMI 29.05 kg/m²   Oxygen Saturation Interpretation: Normal      ------------------------------------------ PROGRESS NOTES ------------------------------------------   I have spoken with the patient and discussed todays results, in addition to providing specific details for the plan of care and counseling regarding the diagnosis and prognosis.   Their questions are answered at this time and they are agreeable with the plan.      --------------------------------- ADDITIONAL PROVIDER NOTES ---------------------------------        This patient is stable for discharge. I have shared the specific conditions for return, as well as the importance of follow-up. IMPRESSION:     1.  Dysfunction of Eustachian tube, unspecified laterality      Patient's Medications   New Prescriptions    CETIRIZINE-PSUEDOEPHEDRINE (ZYRTEC-D) 5-120 MG PER EXTENDED RELEASE TABLET    Take 1 tablet by mouth 2 times daily   Previous Medications    IBUPROFEN (ADVIL;MOTRIN) 800 MG TABLET    Take 1 tablet by mouth every 6 hours as needed for Pain    POLYETHYLENE GLYCOL (MIRALAX) POWD POWDER    Take 17 g by mouth daily   Modified Medications    No medications on file   Discontinued Medications    No medications on file         Procedures     [facility]                  Cory Giraldo DO  11/08/21 4716

## 2022-04-17 ENCOUNTER — APPOINTMENT (OUTPATIENT)
Dept: GENERAL RADIOLOGY | Age: 27
End: 2022-04-17
Payer: COMMERCIAL

## 2022-04-17 ENCOUNTER — APPOINTMENT (OUTPATIENT)
Dept: ULTRASOUND IMAGING | Age: 27
End: 2022-04-17
Payer: COMMERCIAL

## 2022-04-17 ENCOUNTER — APPOINTMENT (OUTPATIENT)
Dept: CT IMAGING | Age: 27
End: 2022-04-17
Payer: COMMERCIAL

## 2022-04-17 ENCOUNTER — HOSPITAL ENCOUNTER (EMERGENCY)
Age: 27
Discharge: HOME | End: 2022-04-17
Attending: STUDENT IN AN ORGANIZED HEALTH CARE EDUCATION/TRAINING PROGRAM
Payer: COMMERCIAL

## 2022-04-17 VITALS
SYSTOLIC BLOOD PRESSURE: 119 MMHG | OXYGEN SATURATION: 98 % | TEMPERATURE: 97.3 F | DIASTOLIC BLOOD PRESSURE: 68 MMHG | HEART RATE: 86 BPM | RESPIRATION RATE: 18 BRPM

## 2022-04-17 DIAGNOSIS — R10.31 RIGHT GROIN PAIN: ICD-10-CM

## 2022-04-17 DIAGNOSIS — M62.838 SPASM OF MUSCLE: Primary | ICD-10-CM

## 2022-04-17 LAB
ALBUMIN SERPL-MCNC: 4.2 G/DL (ref 3.5–5.2)
ALP BLD-CCNC: 123 U/L (ref 35–104)
ALT SERPL-CCNC: 16 U/L (ref 0–32)
ANION GAP SERPL CALCULATED.3IONS-SCNC: 12 MMOL/L (ref 7–16)
AST SERPL-CCNC: 20 U/L (ref 0–31)
BACTERIA: ABNORMAL /HPF
BASOPHILS ABSOLUTE: 0.04 E9/L (ref 0–0.2)
BASOPHILS RELATIVE PERCENT: 0.4 % (ref 0–2)
BILIRUB SERPL-MCNC: <0.2 MG/DL (ref 0–1.2)
BILIRUBIN URINE: NEGATIVE
BLOOD, URINE: ABNORMAL
BUN BLDV-MCNC: 11 MG/DL (ref 6–20)
CALCIUM SERPL-MCNC: 9.5 MG/DL (ref 8.6–10.2)
CHLORIDE BLD-SCNC: 100 MMOL/L (ref 98–107)
CLARITY: CLEAR
CO2: 25 MMOL/L (ref 22–29)
COLOR: YELLOW
CREAT SERPL-MCNC: 0.8 MG/DL (ref 0.5–1)
EOSINOPHILS ABSOLUTE: 0.18 E9/L (ref 0.05–0.5)
EOSINOPHILS RELATIVE PERCENT: 1.6 % (ref 0–6)
EPITHELIAL CELLS, UA: ABNORMAL /HPF
GFR AFRICAN AMERICAN: >60
GFR NON-AFRICAN AMERICAN: >60 ML/MIN/1.73
GLUCOSE BLD-MCNC: 87 MG/DL (ref 74–99)
GLUCOSE URINE: NEGATIVE MG/DL
HCG, URINE, POC: NEGATIVE
HCT VFR BLD CALC: 42.2 % (ref 34–48)
HEMOGLOBIN: 13.4 G/DL (ref 11.5–15.5)
IMMATURE GRANULOCYTES #: 0.04 E9/L
IMMATURE GRANULOCYTES %: 0.4 % (ref 0–5)
KETONES, URINE: NEGATIVE MG/DL
LEUKOCYTE ESTERASE, URINE: NEGATIVE
LYMPHOCYTES ABSOLUTE: 2.73 E9/L (ref 1.5–4)
LYMPHOCYTES RELATIVE PERCENT: 24.8 % (ref 20–42)
Lab: NORMAL
MCH RBC QN AUTO: 28.2 PG (ref 26–35)
MCHC RBC AUTO-ENTMCNC: 31.8 % (ref 32–34.5)
MCV RBC AUTO: 88.8 FL (ref 80–99.9)
MONOCYTES ABSOLUTE: 0.82 E9/L (ref 0.1–0.95)
MONOCYTES RELATIVE PERCENT: 7.4 % (ref 2–12)
NEGATIVE QC PASS/FAIL: NORMAL
NEUTROPHILS ABSOLUTE: 7.21 E9/L (ref 1.8–7.3)
NEUTROPHILS RELATIVE PERCENT: 65.4 % (ref 43–80)
NITRITE, URINE: NEGATIVE
PDW BLD-RTO: 13 FL (ref 11.5–15)
PH UA: 6 (ref 5–9)
PLATELET # BLD: 438 E9/L (ref 130–450)
PMV BLD AUTO: 9.5 FL (ref 7–12)
POSITIVE QC PASS/FAIL: NORMAL
POTASSIUM REFLEX MAGNESIUM: 4.2 MMOL/L (ref 3.5–5)
PROTEIN UA: NEGATIVE MG/DL
RBC # BLD: 4.75 E12/L (ref 3.5–5.5)
RBC UA: ABNORMAL /HPF (ref 0–2)
SODIUM BLD-SCNC: 137 MMOL/L (ref 132–146)
SPECIFIC GRAVITY UA: 1.01 (ref 1–1.03)
TOTAL PROTEIN: 8 G/DL (ref 6.4–8.3)
UROBILINOGEN, URINE: 0.2 E.U./DL
WBC # BLD: 11 E9/L (ref 4.5–11.5)
WBC UA: ABNORMAL /HPF (ref 0–5)

## 2022-04-17 PROCEDURE — 6360000002 HC RX W HCPCS

## 2022-04-17 PROCEDURE — 81001 URINALYSIS AUTO W/SCOPE: CPT

## 2022-04-17 PROCEDURE — 74177 CT ABD & PELVIS W/CONTRAST: CPT

## 2022-04-17 PROCEDURE — 80053 COMPREHEN METABOLIC PANEL: CPT

## 2022-04-17 PROCEDURE — 93976 VASCULAR STUDY: CPT

## 2022-04-17 PROCEDURE — 73502 X-RAY EXAM HIP UNI 2-3 VIEWS: CPT

## 2022-04-17 PROCEDURE — 36415 COLL VENOUS BLD VENIPUNCTURE: CPT

## 2022-04-17 PROCEDURE — 99285 EMERGENCY DEPT VISIT HI MDM: CPT

## 2022-04-17 PROCEDURE — 85025 COMPLETE CBC W/AUTO DIFF WBC: CPT

## 2022-04-17 PROCEDURE — 96374 THER/PROPH/DIAG INJ IV PUSH: CPT

## 2022-04-17 PROCEDURE — 6360000004 HC RX CONTRAST MEDICATION: Performed by: RADIOLOGY

## 2022-04-17 PROCEDURE — 6370000000 HC RX 637 (ALT 250 FOR IP)

## 2022-04-17 PROCEDURE — 76856 US EXAM PELVIC COMPLETE: CPT

## 2022-04-17 PROCEDURE — 96375 TX/PRO/DX INJ NEW DRUG ADDON: CPT

## 2022-04-17 RX ORDER — ORPHENADRINE CITRATE 30 MG/ML
60 INJECTION INTRAMUSCULAR; INTRAVENOUS ONCE
Status: DISCONTINUED | OUTPATIENT
Start: 2022-04-17 | End: 2022-04-17

## 2022-04-17 RX ORDER — FENTANYL CITRATE 0.05 MG/ML
25 INJECTION, SOLUTION INTRAMUSCULAR; INTRAVENOUS ONCE
Status: COMPLETED | OUTPATIENT
Start: 2022-04-17 | End: 2022-04-17

## 2022-04-17 RX ORDER — CYCLOBENZAPRINE HCL 10 MG
5 TABLET ORAL ONCE
Status: COMPLETED | OUTPATIENT
Start: 2022-04-17 | End: 2022-04-17

## 2022-04-17 RX ORDER — KETOROLAC TROMETHAMINE 15 MG/ML
15 INJECTION, SOLUTION INTRAMUSCULAR; INTRAVENOUS ONCE
Status: COMPLETED | OUTPATIENT
Start: 2022-04-17 | End: 2022-04-17

## 2022-04-17 RX ORDER — CYCLOBENZAPRINE HCL 10 MG
10 TABLET ORAL 3 TIMES DAILY PRN
Qty: 9 TABLET | Refills: 0 | Status: SHIPPED | OUTPATIENT
Start: 2022-04-17 | End: 2022-04-20

## 2022-04-17 RX ADMIN — IOPAMIDOL 75 ML: 755 INJECTION, SOLUTION INTRAVENOUS at 20:27

## 2022-04-17 RX ADMIN — KETOROLAC TROMETHAMINE 15 MG: 15 INJECTION, SOLUTION INTRAMUSCULAR; INTRAVENOUS at 18:25

## 2022-04-17 RX ADMIN — CYCLOBENZAPRINE 5 MG: 10 TABLET, FILM COATED ORAL at 18:25

## 2022-04-17 RX ADMIN — FENTANYL CITRATE 25 MCG: 50 INJECTION INTRAMUSCULAR; INTRAVENOUS at 20:04

## 2022-04-17 ASSESSMENT — ENCOUNTER SYMPTOMS
VOMITING: 0
WHEEZING: 0
PHOTOPHOBIA: 0
SHORTNESS OF BREATH: 0
DIARRHEA: 0
NAUSEA: 0
ABDOMINAL PAIN: 0
VOICE CHANGE: 0
TROUBLE SWALLOWING: 0
BACK PAIN: 0

## 2022-04-17 ASSESSMENT — PAIN SCALES - GENERAL
PAINLEVEL_OUTOF10: 9
PAINLEVEL_OUTOF10: 8
PAINLEVEL_OUTOF10: 10

## 2022-04-17 ASSESSMENT — PAIN DESCRIPTION - PAIN TYPE: TYPE: ACUTE PAIN

## 2022-04-17 ASSESSMENT — PAIN - FUNCTIONAL ASSESSMENT: PAIN_FUNCTIONAL_ASSESSMENT: 0-10

## 2022-04-17 ASSESSMENT — PAIN DESCRIPTION - PROGRESSION
CLINICAL_PROGRESSION: NOT CHANGED
CLINICAL_PROGRESSION: NOT CHANGED

## 2022-04-17 NOTE — ED NOTES
Patient resting comfortably in wheelchair at this time, states pain is about the same after medications given around 1830 - breathing unlabored and equal and patient in no apparent distress. Call light in reach, side rails up, and bed in lowest position. Patient states no other needs or complaints at this time.        Lucy Pereira RN  04/17/22 2475

## 2022-04-17 NOTE — ED NOTES
Back from xray, medicated per orders, drinking water for ultrasound and will advise when full     Monico Sims RN  04/17/22 6534

## 2022-04-17 NOTE — ED NOTES
Assumed care - report from SALVAODR Lepe -- pt still in [address] at this time     Bishop Blanchard RN  04/17/22 8986

## 2022-04-17 NOTE — ED PROVIDER NOTES
32y.o. year old female presenting to the emergency room with concerns of groin pain  beginning yesterday morning. PAtient reports that symptom's onset yesterday morning when she woke up. Worsen with external rotation, flexion. Improves with nothing. Severity of 8 out of 10 pain, with noradiation . Symptoms are constant in timing. Symptoms described as throbbing, cramping. Patient reports  associated symptoms of nothing. Patient in  no acute distress. Patient denies any trauma or falls or excessive exertion    Chief Complaint   Patient presents with    Groin Pain     groin pain R side no known injury woke up that way       Review of Systems   Constitutional: Negative for chills, fatigue and fever. HENT: Negative for trouble swallowing and voice change. Eyes: Negative for photophobia and visual disturbance. Respiratory: Negative for shortness of breath and wheezing. Cardiovascular: Negative for chest pain. Gastrointestinal: Negative for abdominal pain, diarrhea, nausea and vomiting. Musculoskeletal: Negative for arthralgias, back pain and neck pain. Skin: Negative for rash and wound. Neurological: Negative for dizziness, syncope, light-headedness, numbness and headaches. Psychiatric/Behavioral: Negative for behavioral problems and confusion. The patient is nervous/anxious. Physical Exam  Vitals reviewed. Constitutional:       General: She is not in acute distress. Appearance: Normal appearance. HENT:      Head: Normocephalic. Right Ear: External ear normal.      Left Ear: External ear normal.      Nose: Nose normal.      Mouth/Throat:      Mouth: Mucous membranes are moist.   Eyes:      General:         Right eye: No discharge. Left eye: No discharge. Conjunctiva/sclera: Conjunctivae normal.   Cardiovascular:      Rate and Rhythm: Normal rate and regular rhythm. Heart sounds: No friction rub.    Pulmonary:      Effort: Pulmonary effort is normal. No respiratory distress. Breath sounds: No stridor. Abdominal:      General: There is no distension. Tenderness: There is no abdominal tenderness. There is no guarding or rebound. Musculoskeletal:         General: Tenderness present. No deformity. Cervical back: Normal range of motion. No rigidity or tenderness. Skin:     General: Skin is warm. Coloration: Skin is not jaundiced. Neurological:      Mental Status: She is alert. Sensory: No sensory deficit. Motor: No weakness. Psychiatric:         Mood and Affect: Mood normal.         Behavior: Behavior normal.          Procedures       MDM  Number of Diagnoses or Management Options  Right groin pain  Spasm of muscle  Diagnosis management comments: 32year old female presenting to the ER with complaints of right groin pain. No similar symptoms in past. Patient awoken from sleep. Patient with difficulty ambulating secondary to pain. No acute abnormality of hip on xray, ultrasound with no acute findings. Patient with no change to pain despite Toradol and flexeril. CT Abdomen and pelvis demonstrates no CT etiology of pain. Patient declines further examination as she does not wish to change into gown. Patient given fentanyl, agreed to ambulate. Patient with improved ambulation at this time. Through shared decision making, will plan to discharge with followup with PCP. Patient reports improvement from initial at this time. ED Course as of 04/20/22 0436   Loan Mccoy Apr 17, 2022 2059 Spoke with patient and discussed changing into a gown and examining with nurse and attending  to look for any abnormalities over the skin and with movement, patient refuses at this time stating that she \" does not feel like getting undressed\". Patient will allow staff to attempt to ambulate with her at this.   [DEBORAH]      ED Course User Index  [DEBORAH] Lena Lugo DO        ED Course as of 04/20/22 0436   Sun Apr 17, 2022 2059 Spoke with patient and discussed changing into a gown and examining with nurse and attending  to look for any abnormalities over the skin and with movement, patient refuses at this time stating that she \" does not feel like getting undressed\". Patient will allow staff to attempt to ambulate with her at this. [DEBORAH]      ED Course User Index  [DEBORAH] Dania Costelloo, DO       --------------------------------------------- PAST HISTORY ---------------------------------------------  Past Medical History:  has a past medical history of Eczema, Environmental allergies, Missed , and  (spontaneous vaginal delivery). Past Surgical History:  has a past surgical history that includes Dilation and curettage of uterus (N/A, 10/25/2019). Social History:  reports that she is a non-smoker but has been exposed to tobacco smoke. She has never used smokeless tobacco. She reports that she does not drink alcohol and does not use drugs. Family History: family history is not on file. The patients home medications have been reviewed. Allergies: Patient has no known allergies.     -------------------------------------------------- RESULTS -------------------------------------------------  Labs:  Results for orders placed or performed during the hospital encounter of 22   CBC with Auto Differential   Result Value Ref Range    WBC 11.0 4.5 - 11.5 E9/L    RBC 4.75 3.50 - 5.50 E12/L    Hemoglobin 13.4 11.5 - 15.5 g/dL    Hematocrit 42.2 34.0 - 48.0 %    MCV 88.8 80.0 - 99.9 fL    MCH 28.2 26.0 - 35.0 pg    MCHC 31.8 (L) 32.0 - 34.5 %    RDW 13.0 11.5 - 15.0 fL    Platelets 716 089 - 673 E9/L    MPV 9.5 7.0 - 12.0 fL    Neutrophils % 65.4 43.0 - 80.0 %    Immature Granulocytes % 0.4 0.0 - 5.0 %    Lymphocytes % 24.8 20.0 - 42.0 %    Monocytes % 7.4 2.0 - 12.0 %    Eosinophils % 1.6 0.0 - 6.0 %    Basophils % 0.4 0.0 - 2.0 %    Neutrophils Absolute 7.21 1.80 - 7.30 E9/L    Immature Granulocytes # 0.04 E9/L    Lymphocytes Absolute 2.73 1.50 - 4.00 E9/L    Monocytes Absolute 0.82 0.10 - 0.95 E9/L    Eosinophils Absolute 0.18 0.05 - 0.50 E9/L    Basophils Absolute 0.04 0.00 - 0.20 E9/L   Comprehensive Metabolic Panel w/ Reflex to MG   Result Value Ref Range    Sodium 137 132 - 146 mmol/L    Potassium reflex Magnesium 4.2 3.5 - 5.0 mmol/L    Chloride 100 98 - 107 mmol/L    CO2 25 22 - 29 mmol/L    Anion Gap 12 7 - 16 mmol/L    Glucose 87 74 - 99 mg/dL    BUN 11 6 - 20 mg/dL    CREATININE 0.8 0.5 - 1.0 mg/dL    GFR Non-African American >60 >=60 mL/min/1.73    GFR African American >60     Calcium 9.5 8.6 - 10.2 mg/dL    Total Protein 8.0 6.4 - 8.3 g/dL    Albumin 4.2 3.5 - 5.2 g/dL    Total Bilirubin <0.2 0.0 - 1.2 mg/dL    Alkaline Phosphatase 123 (H) 35 - 104 U/L    ALT 16 0 - 32 U/L    AST 20 0 - 31 U/L   Urinalysis with Microscopic   Result Value Ref Range    Color, UA Yellow Straw/Yellow    Clarity, UA Clear Clear    Glucose, Ur Negative Negative mg/dL    Bilirubin Urine Negative Negative    Ketones, Urine Negative Negative mg/dL    Specific Gravity, UA 1.010 1.005 - 1.030    Blood, Urine MODERATE (A) Negative    pH, UA 6.0 5.0 - 9.0    Protein, UA Negative Negative mg/dL    Urobilinogen, Urine 0.2 <2.0 E.U./dL    Nitrite, Urine Negative Negative    Leukocyte Esterase, Urine Negative Negative    WBC, UA 0-1 0 - 5 /HPF    RBC, UA 2-5 0 - 2 /HPF    Epithelial Cells, UA FEW /HPF    Bacteria, UA FEW (A) None Seen /HPF   POC Pregnancy Urine   Result Value Ref Range    HCG, Urine, POC Negative Negative    Lot Number [id number]     Positive QC Pass/Fail Pass     Negative QC Pass/Fail Pass        Radiology:  CT ABDOMEN PELVIS W IV CONTRAST Additional Contrast? None   Final Result   No CT etiology for patient's right lower quadrant pain identified. Normal   appendix is visualized. US DUP ABD PEL RETRO SCROT LIMITED   Final Result   No ovarian torsion is seen. US PELVIS COMPLETE   Final Result   Unremarkable transabdominal pelvic ultrasound. XR HIP 2-3 VW W PELVIS RIGHT   Final Result   No acute abnormality of the hip.             ------------------------- NURSING NOTES AND VITALS REVIEWED ---------------------------  Date / Time Roomed:  4/17/2022  4:52 PM  ED Bed Assignment:  14/14    The nursing notes within the ED encounter and vital signs as below have been reviewed. /68   Pulse 86   Temp 97.3 °F (36.3 °C) (Infrared)   Resp 18   SpO2 98%   Oxygen Saturation Interpretation: Normal      ------------------------------------------ PROGRESS NOTES ------------------------------------------  I have spoken with the patient and discussed todays results, in addition to providing specific details for the plan of care and counseling regarding the diagnosis and prognosis. Their questions are answered at this time and they are agreeable with the plan. I discussed at length with them reasons for immediate return here for re evaluation. They will followup with primary care by calling their office tomorrow. --------------------------------- ADDITIONAL PROVIDER NOTES ---------------------------------  At this time the patient is without objective evidence of an acute process requiring hospitalization or inpatient management. They have remained hemodynamically stable throughout their entire ED visit and are stable for discharge with outpatient follow-up. The plan has been discussed in detail and they are aware of the specific conditions for emergent return, as well as the importance of follow-up. Discharge Medication List as of 4/17/2022 10:29 PM      START taking these medications    Details   cyclobenzaprine (FLEXERIL) 10 MG tablet Take 1 tablet by mouth 3 times daily as needed for Muscle spasms, Disp-9 tablet, R-0Print             Diagnosis:  1. Spasm of muscle    2. Right groin pain        Disposition:  Patient's disposition: Discharge to home  Patient's condition is stable.         Attending was present and available throughout encounter including all critical portions;  See Attending Note/Attestation for Final [address], DO  Resident  04/20/22 3411

## 2022-04-17 NOTE — ED NOTES
Iv established and labs drawn/sent, pt states unable to void at this time and pt advised she can't go to xray until we have a urine preg [address] Ruben Macias RN  04/17/22 [id number]

## 2022-04-18 NOTE — ED NOTES
Pt states understanding of teaching and prescriptions at this time, as well as follow up. Pt states no further questions or complaints. IV removed with tip intact and minimal bleeding.      Alek Garcia RN  04/17/22 2802

## 2022-04-18 NOTE — ED NOTES
Dr. Melchor Locks at [address] [place] [address], [address] [place] [address]  04/17/22 2055

## 2022-04-18 NOTE — ED NOTES
Pt ambulate well into hallway -- states it did exacerbate pain, but was able to walk with steady gait.  Notifying Dr. Delfino Jung RN  04/17/22 [id number]

## 2022-05-09 ENCOUNTER — APPOINTMENT (OUTPATIENT)
Dept: CT IMAGING | Age: 27
End: 2022-05-09
Payer: COMMERCIAL

## 2022-05-09 ENCOUNTER — HOSPITAL ENCOUNTER (EMERGENCY)
Age: 27
Discharge: HOME | End: 2022-05-09
Attending: EMERGENCY MEDICINE
Payer: COMMERCIAL

## 2022-05-09 VITALS
RESPIRATION RATE: 18 BRPM | TEMPERATURE: 97.8 F | OXYGEN SATURATION: 99 % | SYSTOLIC BLOOD PRESSURE: 122 MMHG | DIASTOLIC BLOOD PRESSURE: 74 MMHG | HEART RATE: 89 BPM

## 2022-05-09 DIAGNOSIS — N30.00 ACUTE CYSTITIS WITHOUT HEMATURIA: Primary | ICD-10-CM

## 2022-05-09 LAB
ALBUMIN SERPL-MCNC: 4.1 G/DL (ref 3.5–5.2)
ALP BLD-CCNC: 108 U/L (ref 35–104)
ALT SERPL-CCNC: 16 U/L (ref 0–32)
ANION GAP SERPL CALCULATED.3IONS-SCNC: 9 MMOL/L (ref 7–16)
AST SERPL-CCNC: 22 U/L (ref 0–31)
BACTERIA: ABNORMAL /HPF
BASOPHILS ABSOLUTE: 0.05 E9/L (ref 0–0.2)
BASOPHILS RELATIVE PERCENT: 0.5 % (ref 0–2)
BILIRUB SERPL-MCNC: <0.2 MG/DL (ref 0–1.2)
BILIRUBIN URINE: NEGATIVE
BLOOD, URINE: ABNORMAL
BUN BLDV-MCNC: 13 MG/DL (ref 6–20)
CALCIUM SERPL-MCNC: 9.3 MG/DL (ref 8.6–10.2)
CHLORIDE BLD-SCNC: 105 MMOL/L (ref 98–107)
CLARITY: CLEAR
CO2: 24 MMOL/L (ref 22–29)
COLOR: ABNORMAL
CREAT SERPL-MCNC: 0.8 MG/DL (ref 0.5–1)
EOSINOPHILS ABSOLUTE: 0.14 E9/L (ref 0.05–0.5)
EOSINOPHILS RELATIVE PERCENT: 1.4 % (ref 0–6)
EPITHELIAL CELLS, UA: ABNORMAL /HPF
GFR AFRICAN AMERICAN: >60
GFR NON-AFRICAN AMERICAN: >60 ML/MIN/1.73
GLUCOSE BLD-MCNC: 84 MG/DL (ref 74–99)
GLUCOSE URINE: NEGATIVE MG/DL
HCG(URINE) PREGNANCY TEST: NEGATIVE
HCT VFR BLD CALC: 40.5 % (ref 34–48)
HEMOGLOBIN: 12.8 G/DL (ref 11.5–15.5)
IMMATURE GRANULOCYTES #: 0.04 E9/L
IMMATURE GRANULOCYTES %: 0.4 % (ref 0–5)
KETONES, URINE: NEGATIVE MG/DL
LEUKOCYTE ESTERASE, URINE: ABNORMAL
LYMPHOCYTES ABSOLUTE: 2.43 E9/L (ref 1.5–4)
LYMPHOCYTES RELATIVE PERCENT: 24.7 % (ref 20–42)
MCH RBC QN AUTO: 28.4 PG (ref 26–35)
MCHC RBC AUTO-ENTMCNC: 31.6 % (ref 32–34.5)
MCV RBC AUTO: 90 FL (ref 80–99.9)
MONOCYTES ABSOLUTE: 0.74 E9/L (ref 0.1–0.95)
MONOCYTES RELATIVE PERCENT: 7.5 % (ref 2–12)
NEUTROPHILS ABSOLUTE: 6.42 E9/L (ref 1.8–7.3)
NEUTROPHILS RELATIVE PERCENT: 65.5 % (ref 43–80)
NITRITE, URINE: NEGATIVE
PDW BLD-RTO: 13.1 FL (ref 11.5–15)
PH UA: 6.5 (ref 5–9)
PLATELET # BLD: 406 E9/L (ref 130–450)
PMV BLD AUTO: 9.5 FL (ref 7–12)
POTASSIUM REFLEX MAGNESIUM: 4.5 MMOL/L (ref 3.5–5)
PROTEIN UA: ABNORMAL MG/DL
RBC # BLD: 4.5 E12/L (ref 3.5–5.5)
RBC UA: ABNORMAL /HPF (ref 0–2)
SODIUM BLD-SCNC: 138 MMOL/L (ref 132–146)
SPECIFIC GRAVITY UA: 1.01 (ref 1–1.03)
TOTAL PROTEIN: 7.5 G/DL (ref 6.4–8.3)
UROBILINOGEN, URINE: 0.2 E.U./DL
WBC # BLD: 9.8 E9/L (ref 4.5–11.5)
WBC UA: ABNORMAL /HPF (ref 0–5)

## 2022-05-09 PROCEDURE — 2580000003 HC RX 258: Performed by: PHYSICIAN ASSISTANT

## 2022-05-09 PROCEDURE — 81001 URINALYSIS AUTO W/SCOPE: CPT

## 2022-05-09 PROCEDURE — 6370000000 HC RX 637 (ALT 250 FOR IP): Performed by: EMERGENCY MEDICINE

## 2022-05-09 PROCEDURE — 74176 CT ABD & PELVIS W/O CONTRAST: CPT

## 2022-05-09 PROCEDURE — 85025 COMPLETE CBC W/AUTO DIFF WBC: CPT

## 2022-05-09 PROCEDURE — 96374 THER/PROPH/DIAG INJ IV PUSH: CPT

## 2022-05-09 PROCEDURE — 36415 COLL VENOUS BLD VENIPUNCTURE: CPT

## 2022-05-09 PROCEDURE — 6360000002 HC RX W HCPCS: Performed by: PHYSICIAN ASSISTANT

## 2022-05-09 PROCEDURE — 80053 COMPREHEN METABOLIC PANEL: CPT

## 2022-05-09 PROCEDURE — 99284 EMERGENCY DEPT VISIT MOD MDM: CPT

## 2022-05-09 PROCEDURE — 81025 URINE PREGNANCY TEST: CPT

## 2022-05-09 PROCEDURE — 87088 URINE BACTERIA CULTURE: CPT

## 2022-05-09 RX ORDER — CEFDINIR 300 MG/1
300 CAPSULE ORAL 2 TIMES DAILY
Qty: 14 CAPSULE | Refills: 0 | Status: SHIPPED | OUTPATIENT
Start: 2022-05-09 | End: 2022-05-16

## 2022-05-09 RX ORDER — CEFDINIR 300 MG/1
300 CAPSULE ORAL ONCE
Status: COMPLETED | OUTPATIENT
Start: 2022-05-09 | End: 2022-05-09

## 2022-05-09 RX ORDER — KETOROLAC TROMETHAMINE 30 MG/ML
30 INJECTION, SOLUTION INTRAMUSCULAR; INTRAVENOUS ONCE
Status: COMPLETED | OUTPATIENT
Start: 2022-05-09 | End: 2022-05-09

## 2022-05-09 RX ORDER — 0.9 % SODIUM CHLORIDE 0.9 %
1000 INTRAVENOUS SOLUTION INTRAVENOUS ONCE
Status: COMPLETED | OUTPATIENT
Start: 2022-05-09 | End: 2022-05-09

## 2022-05-09 RX ADMIN — CEFDINIR 300 MG: 300 CAPSULE ORAL at 19:00

## 2022-05-09 RX ADMIN — KETOROLAC TROMETHAMINE 30 MG: 30 INJECTION, SOLUTION INTRAMUSCULAR at 17:29

## 2022-05-09 RX ADMIN — SODIUM CHLORIDE 1000 ML: 9 INJECTION, SOLUTION INTRAVENOUS at 17:27

## 2022-05-09 ASSESSMENT — ENCOUNTER SYMPTOMS
WHEEZING: 0
SORE THROAT: 0
NAUSEA: 0
COUGH: 0
SHORTNESS OF BREATH: 0
DIARRHEA: 0
SINUS PRESSURE: 0
ABDOMINAL DISTENTION: 0
ABDOMINAL PAIN: 1
VOMITING: 0
BACK PAIN: 0
CHEST TIGHTNESS: 0

## 2022-05-09 ASSESSMENT — PAIN DESCRIPTION - DESCRIPTORS: DESCRIPTORS: ACHING;BURNING;CRAMPING;DISCOMFORT

## 2022-05-09 ASSESSMENT — PAIN SCALES - GENERAL: PAINLEVEL_OUTOF10: 7

## 2022-05-09 ASSESSMENT — PAIN DESCRIPTION - LOCATION: LOCATION: ABDOMEN

## 2022-05-09 NOTE — ED NOTES
Department of Emergency Medicine  FIRST PROVIDER TRIAGE NOTE             Independent MLP           5/9/22  3:49 PM EDT    Date of Encounter: 5/9/22   MRN: [id number]      HPI: Sundar Rain is a 32 y.o. female who presents to the ED for Flank Pain (DYSURIA X 4 DAYS, FLANK PAIN LEFT SIDE X 2 DAYS)  Patient reports that she has had a kidney infection in the past and that this does feel similar at this time. She admits to associated symptoms of urinary urgency, urinary frequency, dysuria. ROS: Negative for cp, sob, fever, cough, vomiting, diarrhea, rash, headache or dizziness. PE: Gen Appearance/Constitutional: alert  CV: regular rate  Pulm: CTA bilat  GI: soft and NT     Initial Plan of Care: All treatment areas with department are currently occupied. Plan to order/Initiate the following while awaiting opening in ED: labs, imaging studies, analgesics and IV fluids.   Initiate Treatment-Testing, Proceed toTreatment Area When Bed Available for ED Attending/MLP to Continue Care    Electronically signed by SUMANTH Glalagher   DD: 5/9/22         Bryan Gallagher  05/09/22 KAYLEE Donohue 23

## 2022-05-09 NOTE — ED PROVIDER NOTES
Chief complaint:  Flank pain, UTI    HPI history provided by the patient  The patient comes in complaining of 2 to 3 days of urinary frequency and urgency with dysuria. Had hematuria the other day, has some left flank pain with it and had some left mid abdominal pain the other day with it she states was resolved. Having some fatigue with it but no actual fevers at home. No chills. No vomiting or diarrhea. No abdominal pain at this time. No right-sided pain. No history of kidney stones, has history of UTI. No treatment prior to arrival.  Nothing makes her better or worse. Review of Systems   Constitutional: Positive for fatigue. Negative for chills, diaphoresis and fever. HENT: Negative for congestion, sinus pressure and sore throat. Respiratory: Negative for cough, chest tightness, shortness of breath and wheezing. Cardiovascular: Negative for chest pain, palpitations and leg swelling. Gastrointestinal: Positive for abdominal pain. Negative for abdominal distention, diarrhea, nausea and vomiting. Genitourinary: Positive for dysuria, flank pain, frequency, hematuria and urgency. Negative for difficulty urinating, vaginal bleeding and vaginal discharge. Musculoskeletal: Negative for arthralgias, back pain, gait problem, joint swelling, myalgias, neck pain and neck stiffness. Skin: Negative for rash and wound. Neurological: Negative for dizziness, seizures, syncope, weakness, light-headedness, numbness and headaches. All other systems reviewed and are negative. Physical Exam  Vitals and nursing note reviewed. Constitutional:       General: She is not in acute distress. Appearance: She is well-developed. She is not ill-appearing, toxic-appearing or diaphoretic. HENT:      Head: Normocephalic and atraumatic. Eyes:      General: No scleral icterus. Pupils: Pupils are equal, round, and reactive to light.    Cardiovascular:      Rate and Rhythm: Normal rate and regular rhythm. Heart sounds: Normal heart sounds. No murmur heard. Pulmonary:      Effort: Pulmonary effort is normal. No respiratory distress. Breath sounds: Normal breath sounds. No stridor, decreased air movement or transmitted upper airway sounds. No decreased breath sounds, wheezing, rhonchi or rales. Chest:      Chest wall: No tenderness. Abdominal:      General: Bowel sounds are normal. There is no distension. Palpations: Abdomen is soft. Tenderness: There is abdominal tenderness in the suprapubic area. There is left CVA tenderness. There is no right CVA tenderness, guarding or rebound. Comments: Minimal suprapubic discomfort on palpation with mild left CVA tenderness. The rest of the abdomen soft and nontender. No actual right-sided CVA tenderness at this time. Musculoskeletal:         General: No swelling, tenderness, deformity or signs of injury. Cervical back: Normal range of motion and neck supple. Right lower leg: No edema. Left lower leg: No edema. Comments: Arms and legs are neurovascular intact, no pretibial edema or calf pain. Skin:     General: Skin is warm and dry. Coloration: Skin is not cyanotic, jaundiced, mottled or pale. Findings: No erythema or rash. Neurological:      General: No focal deficit present. Mental Status: She is alert and oriented to person, place, and time. GCS: GCS eye subscore is 4. GCS verbal subscore is 5. GCS motor subscore is 6. Cranial Nerves: Cranial nerves are intact. No cranial nerve deficit. Sensory: Sensation is intact. Motor: Motor function is intact. Coordination: Coordination is intact.  Coordination normal.          Procedures     [facility]                --------------------------------------------- PAST HISTORY ---------------------------------------------  Past Medical History:  has a past medical history of Eczema, Environmental allergies, Missed , and  (spontaneous vaginal delivery). Past Surgical History:  has a past surgical history that includes Dilation and curettage of uterus (N/A, 10/25/2019). Social History:  reports that she is a non-smoker but has been exposed to tobacco smoke. She has never used smokeless tobacco. She reports that she does not drink alcohol and does not use drugs. Family History: family history is not on file. The patients home medications have been reviewed. Allergies: Patient has no known allergies.     -------------------------------------------------- RESULTS -------------------------------------------------  Labs:  Results for orders placed or performed during the hospital encounter of 05/09/22   CBC with Auto Differential   Result Value Ref Range    WBC 9.8 4.5 - 11.5 E9/L    RBC 4.50 3.50 - 5.50 E12/L    Hemoglobin 12.8 11.5 - 15.5 g/dL    Hematocrit 40.5 34.0 - 48.0 %    MCV 90.0 80.0 - 99.9 fL    MCH 28.4 26.0 - 35.0 pg    MCHC 31.6 (L) 32.0 - 34.5 %    RDW 13.1 11.5 - 15.0 fL    Platelets 702 311 - 943 E9/L    MPV 9.5 7.0 - 12.0 fL    Neutrophils % 65.5 43.0 - 80.0 %    Immature Granulocytes % 0.4 0.0 - 5.0 %    Lymphocytes % 24.7 20.0 - 42.0 %    Monocytes % 7.5 2.0 - 12.0 %    Eosinophils % 1.4 0.0 - 6.0 %    Basophils % 0.5 0.0 - 2.0 %    Neutrophils Absolute 6.42 1.80 - 7.30 E9/L    Immature Granulocytes # 0.04 E9/L    Lymphocytes Absolute 2.43 1.50 - 4.00 E9/L    Monocytes Absolute 0.74 0.10 - 0.95 E9/L    Eosinophils Absolute 0.14 0.05 - 0.50 E9/L    Basophils Absolute 0.05 0.00 - 0.20 E9/L   Comprehensive Metabolic Panel w/ Reflex to MG   Result Value Ref Range    Sodium 138 132 - 146 mmol/L    Potassium reflex Magnesium 4.5 3.5 - 5.0 mmol/L    Chloride 105 98 - 107 mmol/L    CO2 24 22 - 29 mmol/L    Anion Gap 9 7 - 16 mmol/L    Glucose 84 74 - 99 mg/dL    BUN 13 6 - 20 mg/dL    CREATININE 0.8 0.5 - 1.0 mg/dL    GFR Non-African American >60 >=60 mL/min/1.73    GFR African American >60     Calcium 9.3 8.6 - 10.2 mg/dL    Total Protein 7.5 6.4 - 8.3 g/dL    Albumin 4.1 3.5 - 5.2 g/dL    Total Bilirubin <0.2 0.0 - 1.2 mg/dL    Alkaline Phosphatase 108 (H) 35 - 104 U/L    ALT 16 0 - 32 U/L    AST 22 0 - 31 U/L   Urinalysis with Microscopic   Result Value Ref Range    Color, UA Straw Straw/Yellow    Clarity, UA Clear Clear    Glucose, Ur Negative Negative mg/dL    Bilirubin Urine Negative Negative    Ketones, Urine Negative Negative mg/dL    Specific Gravity, UA 1.015 1.005 - 1.030    Blood, Urine LARGE (A) Negative    pH, UA 6.5 5.0 - 9.0    Protein, UA TRACE Negative mg/dL    Urobilinogen, Urine 0.2 <2.0 E.U./dL    Nitrite, Urine Negative Negative    Leukocyte Esterase, Urine SMALL (A) Negative    WBC, UA 2-5 0 - 5 /HPF    RBC, UA 1-3 0 - 2 /HPF    Epithelial Cells, UA RARE /HPF    Bacteria, UA NONE SEEN None Seen /HPF   Pregnancy, urine   Result Value Ref Range    HCG(Urine) Pregnancy Test NEGATIVE NEGATIVE       Radiology:  CT ABDOMEN PELVIS WO CONTRAST Additional Contrast? None   Final Result   1. No acute abnormality is seen in the abdomen or the pelvis. 2. No urolithiasis or hydronephrosis. 3. No CT evidence of cystitis. RECOMMENDATIONS:   Unavailable             ------------------------- NURSING NOTES AND VITALS REVIEWED ---------------------------  Date / Time Roomed:  5/9/2022  3:47 PM  ED Bed Assignment:  [id number]    The nursing notes within the ED encounter and vital signs as below have been reviewed. /74   Pulse 89   Temp 97.8 °F (36.6 °C) (Infrared)   Resp 18   SpO2 99%   Oxygen Saturation Interpretation: Normal      ------------------------------------------ PROGRESS NOTES ------------------------------------------  I have spoken with the patient and discussed todays results, in addition to providing specific details for the plan of care and counseling regarding the diagnosis and prognosis. Their questions are answered at this time and they are agreeable with the plan.  I discussed at length with them reasons for immediate return here for re evaluation. They will followup with primary care by calling their office tomorrow. --------------------------------- ADDITIONAL PROVIDER NOTES ---------------------------------  At this time the patient is without objective evidence of an acute process requiring hospitalization or inpatient management. They have remained hemodynamically stable throughout their entire ED visit and are stable for discharge with outpatient follow-up. The plan has been discussed in detail and they are aware of the specific conditions for emergent return, as well as the importance of follow-up. New Prescriptions    CEFDINIR (OMNICEF) 300 MG CAPSULE    Take 1 capsule by mouth 2 times daily for 7 days       Diagnosis:  1. Acute cystitis without hematuria        Disposition:  Patient's disposition: Discharge to home  Patient's condition is stable.          Ju Lin DO  05/09/22 3596

## 2022-05-12 LAB — URINE CULTURE, ROUTINE: NORMAL

## 2022-08-10 ENCOUNTER — HOSPITAL ENCOUNTER (EMERGENCY)
Age: 27
Discharge: HOME | End: 2022-08-10
Attending: FAMILY MEDICINE
Payer: COMMERCIAL

## 2022-08-10 VITALS
TEMPERATURE: 97.1 F | SYSTOLIC BLOOD PRESSURE: 117 MMHG | DIASTOLIC BLOOD PRESSURE: 77 MMHG | WEIGHT: 180 LBS | OXYGEN SATURATION: 100 % | HEART RATE: 70 BPM | HEIGHT: 66 IN | RESPIRATION RATE: 16 BRPM | BODY MASS INDEX: 28.93 KG/M2

## 2022-08-10 DIAGNOSIS — L30.9 ECZEMA, UNSPECIFIED TYPE: Primary | ICD-10-CM

## 2022-08-10 DIAGNOSIS — L02.91 ABSCESS: ICD-10-CM

## 2022-08-10 PROCEDURE — 99283 EMERGENCY DEPT VISIT LOW MDM: CPT

## 2022-08-10 RX ORDER — SULFAMETHOXAZOLE AND TRIMETHOPRIM 800; 160 MG/1; MG/1
1 TABLET ORAL 2 TIMES DAILY
Qty: 14 TABLET | Refills: 0 | Status: SHIPPED | OUTPATIENT
Start: 2022-08-10 | End: 2022-08-17

## 2022-08-10 RX ORDER — PETROLATUM,WHITE 41 %
OINTMENT (GRAM) TOPICAL
Qty: 1 EACH | Refills: 0 | Status: SHIPPED | OUTPATIENT
Start: 2022-08-10

## 2022-08-10 RX ORDER — TRIAMCINOLONE ACETONIDE 0.25 MG/G
OINTMENT TOPICAL 2 TIMES DAILY
Qty: 80 G | Refills: 0 | Status: SHIPPED | OUTPATIENT
Start: 2022-08-10 | End: 2022-08-17

## 2022-08-10 ASSESSMENT — PAIN DESCRIPTION - PAIN TYPE: TYPE: ACUTE PAIN

## 2022-08-10 ASSESSMENT — PAIN DESCRIPTION - ORIENTATION: ORIENTATION: LEFT;LOWER

## 2022-08-10 ASSESSMENT — PAIN - FUNCTIONAL ASSESSMENT: PAIN_FUNCTIONAL_ASSESSMENT: 0-10

## 2022-08-10 ASSESSMENT — PAIN SCALES - GENERAL: PAINLEVEL_OUTOF10: 7

## 2022-08-10 ASSESSMENT — PAIN DESCRIPTION - LOCATION: LOCATION: LEG

## 2022-08-10 ASSESSMENT — PAIN DESCRIPTION - ONSET: ONSET: ON-GOING

## 2022-08-10 ASSESSMENT — PAIN DESCRIPTION - DESCRIPTORS: DESCRIPTORS: TENDER;SORE;ACHING

## 2022-08-10 ASSESSMENT — PAIN DESCRIPTION - FREQUENCY: FREQUENCY: CONTINUOUS

## 2022-08-10 NOTE — ED PROVIDER NOTES
HPI:  8/10/22,   Time: 5:30 PM EDT         Huma Washington is a 32 y.o. female presenting to the ED for an infected area on the left lower leg. She also is requesting a refill for topical steroid for her chronic eczema the left ankle as well. She has a raised area for about 1week on the left calf, not improving with conservative measures at home. ROS:   Pertinent positives and negatives are stated within HPI, all other systems reviewed and are negative.  --------------------------------------------- PAST HISTORY ---------------------------------------------  Past Medical History:  has a past medical history of Eczema, Environmental allergies, Missed , and  (spontaneous vaginal delivery). Past Surgical History:  has a past surgical history that includes Dilation and curettage of uterus (N/A, 10/25/2019). Social History:  reports that she is a non-smoker but has been exposed to tobacco smoke. She has never used smokeless tobacco. She reports that she does not drink alcohol and does not use drugs. Family History: family history is not on file. The patients home medications have been reviewed. Allergies: Patient has no known allergies. -------------------------------------------------- RESULTS -------------------------------------------------  All laboratory and radiology results have been personally reviewed by myself   LABS:  No results found for this visit on 08/10/22. RADIOLOGY:  Interpreted by Radiologist.  No orders to display       ------------------------- NURSING NOTES AND VITALS REVIEWED ---------------------------   The nursing notes within the ED encounter and vital signs as below have been reviewed.    /77   Pulse 70   Temp 97.1 °F (36.2 °C) (Temporal)   Resp 16   Ht 5' 6\" (1.676 m)   Wt 180 lb (81.6 kg)   LMP 2022   SpO2 100%   BMI 29.05 kg/m²   Oxygen Saturation Interpretation: Normal      ---------------------------------------------------PHYSICAL EXAM--------------------------------------      Constitutional/General: Alert and oriented x3, well appearing, non toxic in NAD  Head: NC/AT  Eyes: PERRL, EOMI  Mouth: Oropharynx clear, handling secretions, no trismus  Neck: Supple, full ROM, no meningeal signs  Pulmonary: Lungs clear to auscultation bilaterally, no wheezes, rales, or rhonchi. Not in respiratory distress  Cardiovascular:  Regular rate and rhythm, no murmurs, gallops, or rubs. 2+ distal pulses  Abdomen: Soft, non tender, non distended,   Extremities: Moves all extremities x 4. Warm and well perfused  Left lower extremity:  On the lateral aspect of the mid calf is a 1 cm raised area with surrounding erythema and a yellowish central area that is dry and intact, no secretion visualized. On the lateral aspect of the distal lower extremity, over the lateral malleolus is an dry thick scaly patch, probably 4 x 5 cm in size. Skin: warm and dry without rash  Neurologic: GCS 15,  Psych: Normal Affect      ------------------------------ ED COURSE/MEDICAL DECISION MAKING----------------------  Medications - No data to display      Medical Decision Making:    Simple    Counseling: The emergency provider has spoken with the patient and discussed todays results, in addition to providing specific details for the plan of care and counseling regarding the diagnosis and prognosis. Questions are answered at this time and they are agreeable with the plan.      --------------------------------- IMPRESSION AND DISPOSITION ---------------------------------    IMPRESSION  1. Eczema, unspecified type    2.  Abscess        DISPOSITION  Disposition: Discharge to home  Patient condition is stable                 Nina Rai MD  08/11/22 4434

## 2022-08-31 ENCOUNTER — HOSPITAL ENCOUNTER (EMERGENCY)
Age: 27
Discharge: HOME | End: 2022-08-31
Payer: COMMERCIAL

## 2022-08-31 VITALS
HEART RATE: 99 BPM | BODY MASS INDEX: 33.89 KG/M2 | SYSTOLIC BLOOD PRESSURE: 126 MMHG | RESPIRATION RATE: 16 BRPM | TEMPERATURE: 97.4 F | DIASTOLIC BLOOD PRESSURE: 59 MMHG | WEIGHT: 210 LBS | OXYGEN SATURATION: 98 %

## 2022-08-31 DIAGNOSIS — Z20.822 ENCOUNTER FOR SCREENING LABORATORY TESTING FOR COVID-19 VIRUS: Primary | ICD-10-CM

## 2022-08-31 LAB — SARS-COV-2, NAAT: DETECTED

## 2022-08-31 PROCEDURE — 99283 EMERGENCY DEPT VISIT LOW MDM: CPT

## 2022-08-31 PROCEDURE — 87635 SARS-COV-2 COVID-19 AMP PRB: CPT

## 2022-08-31 NOTE — Clinical Note
Ame Price was seen and treated in our emergency department on 8/31/2022. She may return to work on 09/05/2022. If you have any questions or concerns, please don't hesitate to call.       Vel Nunez PA-C

## 2022-08-31 NOTE — ED PROVIDER NOTES
[facility]     Department of Emergency Medicine   ED  Encounter Note  Admit Date/RoomTime: 2022 12:50 PM  ED Room:   NAME: Nessa Smith  : 1995  MRN: [id number]     Chief Complaint:  Positive For Covid-19 (Has no complaints. Just wants another test)    HISTORY OF PRESENT ILLNESS        Nessa Smith is a 32 y.o. female who presents to the ED by private vehicle requesting confirmatory testing for COVID-19. Patient states that she had a test at work which was positive and then went home and had a home test which was also positive. Patient is asymptomatic. Symptoms are none in severity and she denies any aggravating or alleviating factors. She denies any known sick contacts. ROS   Pertinent positives and negatives are stated within HPI, all other systems reviewed and are negative. Past Medical History:  has a past medical history of Eczema, Environmental allergies, Missed , and  (spontaneous vaginal delivery). Surgical History:  has a past surgical history that includes Dilation and curettage of uterus (N/A, 10/25/2019). Social History:  reports that she is a non-smoker but has been exposed to tobacco smoke. She has never used smokeless tobacco. She reports that she does not drink alcohol and does not use drugs. Family History: family history is not on file. Allergies: Patient has no known allergies. PHYSICAL EXAM   Oxygen Saturation Interpretation: Normal on room air analysis. ED Triage Vitals   BP Temp Temp src Heart Rate Resp SpO2 Height Weight   22 1246 22 1234 -- 22 1234 22 1234 22 1234 -- 22 1246   (!) 126/59 97.4 °F (36.3 °C)  (!) 114 16 98 %  210 lb (95.3 kg)       Physical Exam  Constitutional/General: Alert and oriented x3, well appearing, non toxic  HEENT:  NC/NT.,  Airway patent.   Neck: Supple, full ROM, no stridor, no meningeal signs  Respiratory: Lungs clear to auscultation bilaterally, no wheezes, rales, or rhonchi. Not in respiratory distress  CV:  Regular rate. Regular rhythm. Musculoskeletal: Moves all extremities x 4. Integument: skin warm and dry. No rashes. Lymphatic: no lymphadenopathy noted  Neurologic: GCS 15  Psychiatric: Normal Affect    Lab / Imaging Results   (All laboratory and radiology results have been personally reviewed by myself)  Labs:  No results found for this visit on 08/31/22. Imaging: All Radiology results interpreted by Radiologist unless otherwise noted. No orders to display       ED Course / Medical Decision Making   Medications - No data to display     Consult(s):   None    Procedure(s):   None    MDM:   Asymptomatic patient presents to the emerged department requesting confirmatory testing against COVID-19. Patient will check MyChart to get her results. Strict return precautions were discussed and she should come back immediately for new or worsening symptoms. Patient also provided with a work note. CDC isolation/quarantine guidelines were discussed. Plan of Care/Counseling:  Jerry Bernal PA-C reviewed today's visit with the patient in addition to providing specific details for the plan of care and counseling regarding the diagnosis and prognosis. Questions are answered at this time and are agreeable with the plan. ASSESSMENT     1. Encounter for screening laboratory testing for COVID-19 virus      PLAN   Discharged home. Patient condition is good    New Medications     Discharge Medication List as of 8/31/2022  1:23 PM        Electronically signed by Jerry Bernal PA-C   DD: 8/31/22  **This report was transcribed using voice recognition software. Every effort was made to ensure accuracy; however, inadvertent computerized transcription errors may be present.   END OF ED PROVIDER NOTE        Jerry Bernal PA-C  08/31/22 0316

## 2023-02-20 ENCOUNTER — HOSPITAL ENCOUNTER (EMERGENCY)
Age: 28
Discharge: HOME | End: 2023-02-20
Attending: EMERGENCY MEDICINE
Payer: COMMERCIAL

## 2023-02-20 VITALS
OXYGEN SATURATION: 96 % | SYSTOLIC BLOOD PRESSURE: 112 MMHG | HEART RATE: 96 BPM | DIASTOLIC BLOOD PRESSURE: 76 MMHG | TEMPERATURE: 96.9 F | RESPIRATION RATE: 16 BRPM | BODY MASS INDEX: 31.86 KG/M2 | WEIGHT: 197.38 LBS

## 2023-02-20 DIAGNOSIS — H69.80 DYSFUNCTION OF EUSTACHIAN TUBE, UNSPECIFIED LATERALITY: Primary | ICD-10-CM

## 2023-02-20 PROCEDURE — 99283 EMERGENCY DEPT VISIT LOW MDM: CPT

## 2023-02-20 RX ORDER — BROMPHENIRAMINE MALEATE, PSEUDOEPHEDRINE HYDROCHLORIDE, AND DEXTROMETHORPHAN HYDROBROMIDE 2; 30; 10 MG/5ML; MG/5ML; MG/5ML
5 SYRUP ORAL 4 TIMES DAILY PRN
Qty: 120 ML | Refills: 0 | Status: SHIPPED | OUTPATIENT
Start: 2023-02-20

## 2023-02-20 ASSESSMENT — ENCOUNTER SYMPTOMS
ABDOMINAL PAIN: 0
SORE THROAT: 0
ABDOMINAL DISTENTION: 0
BACK PAIN: 0
EYE PAIN: 0
SHORTNESS OF BREATH: 0
VOMITING: 0
EYE REDNESS: 0
DIARRHEA: 0
COUGH: 0
NAUSEA: 0
EYE DISCHARGE: 0
WHEEZING: 0
SINUS PRESSURE: 0
BLOOD IN STOOL: 0

## 2023-02-20 ASSESSMENT — PAIN - FUNCTIONAL ASSESSMENT: PAIN_FUNCTIONAL_ASSESSMENT: NONE - DENIES PAIN

## 2023-02-20 NOTE — ED PROVIDER NOTES
The history is provided by the patient.   Ear Problem  Location:  Bilateral  Quality:  Aching  Severity:  Mild  Onset quality:  Gradual  Duration:  3 days  Chronicity:  New  Associated symptoms: congestion    Associated symptoms: no abdominal pain, no cough, no diarrhea, no fever, no headaches, no rash, no sore throat and no vomiting       Review of Systems   Constitutional:  Negative for chills and fever.   HENT:  Positive for congestion. Negative for ear pain, sinus pressure and sore throat.    Eyes:  Negative for pain, discharge and redness.   Respiratory:  Negative for cough, shortness of breath and wheezing.    Cardiovascular:  Negative for chest pain.   Gastrointestinal:  Negative for abdominal distention, abdominal pain, blood in stool, diarrhea, nausea and vomiting.   Genitourinary:  Negative for dysuria and frequency.   Musculoskeletal:  Negative for arthralgias and back pain.   Skin:  Negative for rash and wound.   Neurological:  Negative for weakness and headaches.   Hematological:  Negative for adenopathy.   All other systems reviewed and are negative.     Physical Exam  Vitals and nursing note reviewed.   Constitutional:       Appearance: She is well-developed.   HENT:      Head: Normocephalic and atraumatic.      Right Ear: Hearing and external ear normal. Tympanic membrane is retracted.      Left Ear: Hearing and external ear normal. Tympanic membrane is retracted.      Nose: Mucosal edema and congestion present.      Mouth/Throat:      Pharynx: Uvula midline.   Eyes:      General: Lids are normal.      Conjunctiva/sclera: Conjunctivae normal.      Pupils: Pupils are equal, round, and reactive to light.   Cardiovascular:      Rate and Rhythm: Normal rate and regular rhythm.      Heart sounds: Normal heart sounds. No murmur heard.  Pulmonary:      Effort: Pulmonary effort is normal. No respiratory distress.      Breath sounds: Normal breath sounds. No wheezing or rales.   Abdominal:      General:  Bowel sounds are normal.      Palpations: Abdomen is soft. Abdomen is not rigid. Tenderness: There is no abdominal tenderness. There is no guarding or rebound. Musculoskeletal:      Cervical back: Normal range of motion and neck supple. Skin:     General: Skin is warm and dry. Findings: No abrasion or rash. Neurological:      Mental Status: She is alert and oriented to person, place, and time. GCS: GCS eye subscore is 4. GCS verbal subscore is 5. GCS motor subscore is 6. Cranial Nerves: No cranial nerve deficit. Sensory: No sensory deficit. Coordination: Coordination normal.      Gait: Gait normal.        Procedures     MDM          --------------------------------------------- PAST HISTORY ---------------------------------------------  Past Medical History:  has a past medical history of Eczema, Environmental allergies, Missed , and  (spontaneous vaginal delivery). Past Surgical History:  has a past surgical history that includes Dilation and curettage of uterus (N/A, 10/25/2019). Social History:  reports that she is a non-smoker but has been exposed to tobacco smoke. She has never used smokeless tobacco. She reports that she does not drink alcohol and does not use drugs. Family History: family history is not on file. The patients home medications have been reviewed. Allergies: Patient has no known allergies. -------------------------------------------------- RESULTS -------------------------------------------------  Labs:  No results found for this visit on 23. Radiology:  No orders to display       ------------------------- NURSING NOTES AND VITALS REVIEWED ---------------------------  Date / Time Roomed:  2023  4:41 PM  ED Bed Assignment:      The nursing notes within the ED encounter and vital signs as below have been reviewed.    /76   Pulse 96   Temp 96.9 °F (36.1 °C) (Temporal)   Resp 16   Wt 197 lb 6 oz (89.5 kg) LMP 02/02/2023 (Exact Date)   SpO2 96%   BMI 31.86 kg/m²   Oxygen Saturation Interpretation: Normal      ------------------------------------------ PROGRESS NOTES ------------------------------------------  I have spoken with the patient and discussed todays results, in addition to providing specific details for the plan of care and counseling regarding the diagnosis and prognosis. Their questions are answered at this time and they are agreeable with the plan. I discussed at length with them reasons for immediate return here for re evaluation. They will followup with primary care by calling their office tomorrow. Medications - No data to display      --------------------------------- ADDITIONAL PROVIDER NOTES ---------------------------------  At this time the patient is without objective evidence of an acute process requiring hospitalization or inpatient management. They have remained hemodynamically stable throughout their entire ED visit and are stable for discharge with outpatient follow-up. The plan has been discussed in detail and they are aware of the specific conditions for emergent return, as well as the importance of follow-up. New Prescriptions    BROMPHENIRAMINE-PSEUDOEPHEDRINE-DM 2-30-10 MG/5ML SYRUP    Take 5 mLs by mouth 4 times daily as needed for Cough or Congestion       Diagnosis:  1. Dysfunction of Eustachian tube, unspecified laterality        Disposition:  Patient's disposition: Discharge to home  Patient's condition is stable.                     Cliff Johnson MD  02/20/23 1785

## 2023-05-12 ENCOUNTER — HOSPITAL ENCOUNTER (EMERGENCY)
Age: 28
Discharge: HOME | End: 2023-05-12
Attending: EMERGENCY MEDICINE
Payer: COMMERCIAL

## 2023-05-12 VITALS
WEIGHT: 191 LBS | OXYGEN SATURATION: 100 % | TEMPERATURE: 98 F | SYSTOLIC BLOOD PRESSURE: 112 MMHG | RESPIRATION RATE: 20 BRPM | BODY MASS INDEX: 30.83 KG/M2 | DIASTOLIC BLOOD PRESSURE: 72 MMHG | HEART RATE: 90 BPM

## 2023-05-12 DIAGNOSIS — M54.31 SCIATICA OF RIGHT SIDE: Primary | ICD-10-CM

## 2023-05-12 PROCEDURE — 99281 EMR DPT VST MAYX REQ PHY/QHP: CPT

## 2023-05-12 RX ORDER — DEXAMETHASONE SODIUM PHOSPHATE 10 MG/ML
10 INJECTION INTRAMUSCULAR; INTRAVENOUS ONCE
Status: DISCONTINUED | OUTPATIENT
Start: 2023-05-12 | End: 2023-05-12 | Stop reason: HOSPADM

## 2023-05-12 RX ORDER — ACETAMINOPHEN 325 MG/1
650 TABLET ORAL ONCE
Status: DISCONTINUED | OUTPATIENT
Start: 2023-05-12 | End: 2023-05-12 | Stop reason: HOSPADM

## 2023-05-12 ASSESSMENT — ENCOUNTER SYMPTOMS
ABDOMINAL DISTENTION: 0
SHORTNESS OF BREATH: 0
SORE THROAT: 0
NAUSEA: 0
COUGH: 0
WHEEZING: 0
DIARRHEA: 0
VOMITING: 0
SINUS PRESSURE: 0
BACK PAIN: 1
ABDOMINAL PAIN: 0
CHEST TIGHTNESS: 0

## 2023-05-12 ASSESSMENT — PAIN - FUNCTIONAL ASSESSMENT: PAIN_FUNCTIONAL_ASSESSMENT: 0-10

## 2023-05-12 ASSESSMENT — PAIN SCALES - GENERAL: PAINLEVEL_OUTOF10: 9

## 2023-05-12 ASSESSMENT — PAIN DESCRIPTION - ORIENTATION: ORIENTATION: RIGHT;LOWER

## 2023-05-12 ASSESSMENT — PAIN DESCRIPTION - LOCATION: LOCATION: BACK;BUTTOCKS;LEG

## 2023-05-12 ASSESSMENT — LIFESTYLE VARIABLES: HOW OFTEN DO YOU HAVE A DRINK CONTAINING ALCOHOL: NEVER

## 2023-05-12 ASSESSMENT — PAIN DESCRIPTION - DESCRIPTORS: DESCRIPTORS: ACHING

## 2023-05-12 ASSESSMENT — PAIN DESCRIPTION - FREQUENCY: FREQUENCY: CONTINUOUS

## 2023-05-12 NOTE — ED NOTES
This RN walked into pt's room to give pt ordered medications for back pain. Pt refused medications. Pt educated on the reasoning for medications. Provider notified. Before this RN could discharge pt, pt and significant other walked out of facility. Provider made aware.       Kalli Rivera RN  05/12/23 2423

## 2023-05-12 NOTE — ED PROVIDER NOTES
No murmur heard. Pulmonary:      Effort: Pulmonary effort is normal. No respiratory distress. Breath sounds: Normal breath sounds. No stridor, decreased air movement or transmitted upper airway sounds. No decreased breath sounds, wheezing, rhonchi or rales. Chest:      Chest wall: No tenderness. Abdominal:      General: Bowel sounds are normal. There is no distension. Palpations: Abdomen is soft. Tenderness: There is no abdominal tenderness. There is no right CVA tenderness, left CVA tenderness, guarding or rebound. Musculoskeletal:         General: No swelling, tenderness, deformity or signs of injury. Cervical back: Full passive range of motion without pain, normal range of motion and neck supple. No signs of trauma or rigidity. No spinous process tenderness or muscular tenderness. Normal range of motion. Right lower leg: No edema. Left lower leg: No edema. Comments: Arms and legs are neurovascular intact with no signs of acute bony or joint injury, no pretibial edema or calf pain. No midline cervical, thoracic or lumbar spine tenderness. Very minimal tenderness at the right SI joint area into the right posterior lateral buttock over the expected course of the sciatic nerve. No thigh or calf pain or swelling on the right leg. Right leg is neurovascular intact. Bilateral lower extremities are neurovascular intact and have no signs of acute bony or joint injury. Lymphadenopathy:      Cervical: No cervical adenopathy. Skin:     General: Skin is warm and dry. Coloration: Skin is not cyanotic, jaundiced, mottled or pale. Findings: No bruising, erythema or rash. Neurological:      General: No focal deficit present. Mental Status: She is alert and oriented to person, place, and time. GCS: GCS eye subscore is 4. GCS verbal subscore is 5. GCS motor subscore is 6. Cranial Nerves: Cranial nerves 2-12 are intact. No cranial nerve deficit.

## 2023-07-22 ENCOUNTER — HOSPITAL ENCOUNTER (EMERGENCY)
Age: 28
Discharge: LEFT BEFORE DISCHARGE | End: 2023-07-22
Attending: FAMILY MEDICINE

## 2023-07-22 VITALS
HEART RATE: 101 BPM | OXYGEN SATURATION: 100 % | BODY MASS INDEX: 31.47 KG/M2 | DIASTOLIC BLOOD PRESSURE: 68 MMHG | SYSTOLIC BLOOD PRESSURE: 121 MMHG | TEMPERATURE: 97.2 F | WEIGHT: 195 LBS | RESPIRATION RATE: 16 BRPM

## 2023-07-22 ASSESSMENT — PAIN - FUNCTIONAL ASSESSMENT: PAIN_FUNCTIONAL_ASSESSMENT: NONE - DENIES PAIN

## 2023-07-22 NOTE — ED NOTES
Pt not in bathroom and urine specimen cup on counter unused. Probably elopement.      Jayro , RN  23 0680

## 2023-07-22 NOTE — ED NOTES
RLQ abd. Pt expresses  uncertain regarding having STD testing if the result is not going to be available to her today. Pt states unable to provide urine specimen at this time.      Marcia Frazier RN  07/22/23 1135

## 2023-10-03 ENCOUNTER — APPOINTMENT (OUTPATIENT)
Dept: LABOR AND DELIVERY | Age: 28
End: 2023-10-03
Payer: COMMERCIAL

## 2023-10-03 ENCOUNTER — HOSPITAL ENCOUNTER (INPATIENT)
Age: 28
LOS: 2 days | Discharge: HOME | End: 2023-10-05
Attending: OBSTETRICS & GYNECOLOGY | Admitting: OBSTETRICS & GYNECOLOGY
Payer: COMMERCIAL

## 2023-10-03 PROBLEM — Z34.93 NORMAL PREGNANCY, THIRD TRIMESTER: Status: RESOLVED | Noted: 2023-10-03 | Resolved: 2023-10-03

## 2023-10-03 PROBLEM — Z34.93 NORMAL PREGNANCY, THIRD TRIMESTER: Status: ACTIVE | Noted: 2023-10-03

## 2023-10-03 PROBLEM — Z34.90 TERM PREGNANCY: Status: RESOLVED | Noted: 2021-05-25 | Resolved: 2023-10-03

## 2023-10-03 LAB
ABO + RH BLD: NORMAL
ALBUMIN SERPL-MCNC: 3.7 G/DL (ref 3.5–5.2)
ALP SERPL-CCNC: 166 U/L (ref 35–104)
ALT SERPL-CCNC: 11 U/L (ref 0–32)
AMPHET UR QL SCN: NEGATIVE
ANION GAP SERPL CALCULATED.3IONS-SCNC: 12 MMOL/L (ref 7–16)
ARM BAND NUMBER: NORMAL
AST SERPL-CCNC: 20 U/L (ref 0–31)
BARBITURATES UR QL SCN: NEGATIVE
BASOPHILS # BLD: 0.03 K/UL (ref 0–0.2)
BASOPHILS NFR BLD: 0 % (ref 0–2)
BENZODIAZ UR QL: NEGATIVE
BILIRUB SERPL-MCNC: 0.3 MG/DL (ref 0–1.2)
BLOOD BANK SAMPLE EXPIRATION: NORMAL
BLOOD GROUP ANTIBODIES SERPL: NEGATIVE
BUN SERPL-MCNC: 4 MG/DL (ref 6–20)
BUPRENORPHINE UR QL: NEGATIVE
CALCIUM SERPL-MCNC: 9.2 MG/DL (ref 8.6–10.2)
CANNABINOIDS UR QL SCN: NEGATIVE
CHLORIDE SERPL-SCNC: 104 MMOL/L (ref 98–107)
CO2 SERPL-SCNC: 19 MMOL/L (ref 22–29)
COCAINE UR QL SCN: NEGATIVE
CREAT SERPL-MCNC: 0.5 MG/DL (ref 0.5–1)
EOSINOPHIL # BLD: 0.02 K/UL (ref 0.05–0.5)
EOSINOPHILS RELATIVE PERCENT: 0 % (ref 0–6)
ERYTHROCYTE [DISTWIDTH] IN BLOOD BY AUTOMATED COUNT: 12.9 % (ref 11.5–15)
FENTANYL UR QL: NEGATIVE
GFR SERPL CREATININE-BSD FRML MDRD: >60 ML/MIN/1.73M2
GLUCOSE SERPL-MCNC: 98 MG/DL (ref 74–99)
HCT VFR BLD AUTO: 36.8 % (ref 34–48)
HGB BLD-MCNC: 12.1 G/DL (ref 11.5–15.5)
IMM GRANULOCYTES # BLD AUTO: <0.03 K/UL (ref 0–0.58)
IMM GRANULOCYTES NFR BLD: 0 % (ref 0–5)
LYMPHOCYTES NFR BLD: 2.7 K/UL (ref 1.5–4)
LYMPHOCYTES RELATIVE PERCENT: 31 % (ref 20–42)
MCH RBC QN AUTO: 27.4 PG (ref 26–35)
MCHC RBC AUTO-ENTMCNC: 32.9 G/DL (ref 32–34.5)
MCV RBC AUTO: 83.3 FL (ref 80–99.9)
METHADONE UR QL: NEGATIVE
MONOCYTES NFR BLD: 0.74 K/UL (ref 0.1–0.95)
MONOCYTES NFR BLD: 9 % (ref 2–12)
NEUTROPHILS NFR BLD: 60 % (ref 43–80)
NEUTS SEG NFR BLD: 5.17 K/UL (ref 1.8–7.3)
OPIATES UR QL SCN: NEGATIVE
OXYCODONE UR QL SCN: NEGATIVE
PCP UR QL SCN: NEGATIVE
PLATELET, FLUORESCENCE: 343 K/UL (ref 130–450)
PMV BLD AUTO: 10.7 FL (ref 7–12)
POTASSIUM SERPL-SCNC: 3.9 MMOL/L (ref 3.5–5)
PROT SERPL-MCNC: 6.9 G/DL (ref 6.4–8.3)
RBC # BLD AUTO: 4.42 M/UL (ref 3.5–5.5)
SODIUM SERPL-SCNC: 135 MMOL/L (ref 132–146)
TEST INFORMATION: NORMAL
WBC OTHER # BLD: 8.8 K/UL (ref 4.5–11.5)

## 2023-10-03 PROCEDURE — 1220000001 HC SEMI PRIVATE L&D R&B

## 2023-10-03 PROCEDURE — 85027 COMPLETE CBC AUTOMATED: CPT

## 2023-10-03 PROCEDURE — 7200000001 HC VAGINAL DELIVERY

## 2023-10-03 PROCEDURE — 86900 BLOOD TYPING SEROLOGIC ABO: CPT

## 2023-10-03 PROCEDURE — 87491 CHLMYD TRACH DNA AMP PROBE: CPT

## 2023-10-03 PROCEDURE — 10907ZC DRAINAGE OF AMNIOTIC FLUID, THERAPEUTIC FROM PRODUCTS OF CONCEPTION, VIA NATURAL OR ARTIFICIAL OPENING: ICD-10-PCS | Performed by: OBSTETRICS & GYNECOLOGY

## 2023-10-03 PROCEDURE — 86592 SYPHILIS TEST NON-TREP QUAL: CPT

## 2023-10-03 PROCEDURE — 6360000002 HC RX W HCPCS: Performed by: OBSTETRICS & GYNECOLOGY

## 2023-10-03 PROCEDURE — 87591 N.GONORRHOEAE DNA AMP PROB: CPT

## 2023-10-03 PROCEDURE — 86901 BLOOD TYPING SEROLOGIC RH(D): CPT

## 2023-10-03 PROCEDURE — 86850 RBC ANTIBODY SCREEN: CPT

## 2023-10-03 PROCEDURE — 6370000000 HC RX 637 (ALT 250 FOR IP): Performed by: OBSTETRICS & GYNECOLOGY

## 2023-10-03 PROCEDURE — 2580000003 HC RX 258: Performed by: OBSTETRICS & GYNECOLOGY

## 2023-10-03 PROCEDURE — 80053 COMPREHEN METABOLIC PANEL: CPT

## 2023-10-03 PROCEDURE — 80307 DRUG TEST PRSMV CHEM ANLYZR: CPT

## 2023-10-03 RX ORDER — SODIUM CHLORIDE, SODIUM LACTATE, POTASSIUM CHLORIDE, AND CALCIUM CHLORIDE .6; .31; .03; .02 G/100ML; G/100ML; G/100ML; G/100ML
500 INJECTION, SOLUTION INTRAVENOUS PRN
Status: DISCONTINUED | OUTPATIENT
Start: 2023-10-03 | End: 2023-10-03

## 2023-10-03 RX ORDER — MISOPROSTOL 200 UG/1
800 TABLET ORAL PRN
Status: DISCONTINUED | OUTPATIENT
Start: 2023-10-03 | End: 2023-10-03

## 2023-10-03 RX ORDER — SODIUM CHLORIDE 0.9 % (FLUSH) 0.9 %
5-40 SYRINGE (ML) INJECTION PRN
Status: DISCONTINUED | OUTPATIENT
Start: 2023-10-03 | End: 2023-10-05 | Stop reason: HOSPADM

## 2023-10-03 RX ORDER — ACETAMINOPHEN 500 MG
1000 TABLET ORAL EVERY 8 HOURS PRN
Status: DISCONTINUED | OUTPATIENT
Start: 2023-10-03 | End: 2023-10-05 | Stop reason: HOSPADM

## 2023-10-03 RX ORDER — DOCUSATE SODIUM 100 MG/1
100 CAPSULE, LIQUID FILLED ORAL 2 TIMES DAILY
Status: DISCONTINUED | OUTPATIENT
Start: 2023-10-03 | End: 2023-10-05 | Stop reason: HOSPADM

## 2023-10-03 RX ORDER — SODIUM CHLORIDE 0.9 % (FLUSH) 0.9 %
5-40 SYRINGE (ML) INJECTION EVERY 12 HOURS SCHEDULED
Status: DISCONTINUED | OUTPATIENT
Start: 2023-10-03 | End: 2023-10-05 | Stop reason: HOSPADM

## 2023-10-03 RX ORDER — FERROUS SULFATE 325(65) MG
325 TABLET ORAL 2 TIMES DAILY WITH MEALS
Status: DISCONTINUED | OUTPATIENT
Start: 2023-10-04 | End: 2023-10-05 | Stop reason: HOSPADM

## 2023-10-03 RX ORDER — SODIUM CHLORIDE 9 MG/ML
25 INJECTION, SOLUTION INTRAVENOUS PRN
Status: DISCONTINUED | OUTPATIENT
Start: 2023-10-03 | End: 2023-10-03

## 2023-10-03 RX ORDER — ONDANSETRON 2 MG/ML
4 INJECTION INTRAMUSCULAR; INTRAVENOUS EVERY 6 HOURS PRN
Status: DISCONTINUED | OUTPATIENT
Start: 2023-10-03 | End: 2023-10-03

## 2023-10-03 RX ORDER — IBUPROFEN 800 MG/1
800 TABLET ORAL EVERY 8 HOURS PRN
Status: DISCONTINUED | OUTPATIENT
Start: 2023-10-03 | End: 2023-10-05 | Stop reason: HOSPADM

## 2023-10-03 RX ORDER — DOCUSATE SODIUM 100 MG/1
100 CAPSULE, LIQUID FILLED ORAL 2 TIMES DAILY
Status: DISCONTINUED | OUTPATIENT
Start: 2023-10-03 | End: 2023-10-03

## 2023-10-03 RX ORDER — CARBOPROST TROMETHAMINE 250 UG/ML
250 INJECTION, SOLUTION INTRAMUSCULAR PRN
Status: DISCONTINUED | OUTPATIENT
Start: 2023-10-03 | End: 2023-10-03

## 2023-10-03 RX ORDER — SODIUM CHLORIDE 0.9 % (FLUSH) 0.9 %
5-40 SYRINGE (ML) INJECTION PRN
Status: DISCONTINUED | OUTPATIENT
Start: 2023-10-03 | End: 2023-10-03

## 2023-10-03 RX ORDER — METHYLERGONOVINE MALEATE 0.2 MG/ML
200 INJECTION INTRAVENOUS PRN
Status: DISCONTINUED | OUTPATIENT
Start: 2023-10-03 | End: 2023-10-03

## 2023-10-03 RX ORDER — SODIUM CHLORIDE, SODIUM LACTATE, POTASSIUM CHLORIDE, AND CALCIUM CHLORIDE .6; .31; .03; .02 G/100ML; G/100ML; G/100ML; G/100ML
1000 INJECTION, SOLUTION INTRAVENOUS PRN
Status: DISCONTINUED | OUTPATIENT
Start: 2023-10-03 | End: 2023-10-03

## 2023-10-03 RX ORDER — MODIFIED LANOLIN
OINTMENT (GRAM) TOPICAL PRN
Status: DISCONTINUED | OUTPATIENT
Start: 2023-10-03 | End: 2023-10-05 | Stop reason: HOSPADM

## 2023-10-03 RX ORDER — ONDANSETRON 4 MG/1
8 TABLET, ORALLY DISINTEGRATING ORAL EVERY 8 HOURS PRN
Status: DISCONTINUED | OUTPATIENT
Start: 2023-10-03 | End: 2023-10-05 | Stop reason: HOSPADM

## 2023-10-03 RX ORDER — SODIUM CHLORIDE 0.9 % (FLUSH) 0.9 %
5-40 SYRINGE (ML) INJECTION EVERY 12 HOURS SCHEDULED
Status: DISCONTINUED | OUTPATIENT
Start: 2023-10-03 | End: 2023-10-03

## 2023-10-03 RX ORDER — SODIUM CHLORIDE, SODIUM LACTATE, POTASSIUM CHLORIDE, CALCIUM CHLORIDE 600; 310; 30; 20 MG/100ML; MG/100ML; MG/100ML; MG/100ML
INJECTION, SOLUTION INTRAVENOUS CONTINUOUS
Status: DISCONTINUED | OUTPATIENT
Start: 2023-10-03 | End: 2023-10-03

## 2023-10-03 RX ORDER — SODIUM CHLORIDE 9 MG/ML
INJECTION, SOLUTION INTRAVENOUS PRN
Status: DISCONTINUED | OUTPATIENT
Start: 2023-10-03 | End: 2023-10-05 | Stop reason: HOSPADM

## 2023-10-03 RX ORDER — LIDOCAINE HYDROCHLORIDE 10 MG/ML
INJECTION, SOLUTION INFILTRATION; PERINEURAL
Status: DISCONTINUED
Start: 2023-10-03 | End: 2023-10-03 | Stop reason: WASHOUT

## 2023-10-03 RX ADMIN — SODIUM CHLORIDE, POTASSIUM CHLORIDE, SODIUM LACTATE AND CALCIUM CHLORIDE 500 ML: 600; 310; 30; 20 INJECTION, SOLUTION INTRAVENOUS at 03:52

## 2023-10-03 RX ADMIN — DEXTROSE MONOHYDRATE 5 MILLION UNITS: 5 INJECTION INTRAVENOUS at 03:53

## 2023-10-03 RX ADMIN — Medication 2 MILLI-UNITS/MIN: at 05:55

## 2023-10-03 RX ADMIN — IBUPROFEN 800 MG: 800 TABLET, FILM COATED ORAL at 23:34

## 2023-10-03 RX ADMIN — DOCUSATE SODIUM 100 MG: 100 CAPSULE, LIQUID FILLED ORAL at 22:58

## 2023-10-03 ASSESSMENT — PAIN DESCRIPTION - LOCATION: LOCATION: ABDOMEN

## 2023-10-03 ASSESSMENT — PAIN SCALES - GENERAL: PAINLEVEL_OUTOF10: 7

## 2023-10-03 ASSESSMENT — PAIN DESCRIPTION - ORIENTATION: ORIENTATION: LOWER

## 2023-10-03 ASSESSMENT — PAIN DESCRIPTION - DESCRIPTORS: DESCRIPTORS: CRAMPING

## 2023-10-03 ASSESSMENT — PAIN - FUNCTIONAL ASSESSMENT: PAIN_FUNCTIONAL_ASSESSMENT: ACTIVITIES ARE NOT PREVENTED

## 2023-10-03 NOTE — PROGRESS NOTES
Patients recovery started at 0650, pt verbalizes good understanding of vitals bleeding and fundus checks [id number] for the next 2 hrs.

## 2023-10-03 NOTE — PROGRESS NOTES
40w4d  Patient on unit with c/o ctx since 130. Pt reports no leaking and spotting since VE this AM at the office. Oriented to [address] Plan of care discussed.  Call light within reach

## 2023-10-03 NOTE — PROGRESS NOTES
Recovery period ended and patient is in stable condition. This RN at the bedside to stand by assist patient up to the bathroom to void. Patient tolerated ambulation well. Patient denies pain at this time.

## 2023-10-03 NOTE — L&D DELIVERY NOTE
Department of Obstetrics and Gynecology  Spontaneous Vaginal Delivery Note         Pre-operative Diagnosis:  Term pregnancy, Spontaneous labor, Single fetus, and Uncomplicated pregnancy    Post-operative Diagnosis:  Same + live male wt 7 #,4 oz  Procedure:  Spontaneous vaginal delivery    Surgeon:  Dom Willoughby MD    Anesthesia:  none    Estimated blood loss:  200    Specimen:  Placenta not sent to pathology     Cord blood sent Yes    Complications:  none    Condition:  infant stable to general nursery and mother stable    Details of Procedure: The patient is a 25 y.o. female at 44w2d   OB History          10    Para   6    Term   6       0    AB   2    Living   6         SAB   1    IAB   0    Ectopic   0    Molar   0    Multiple   0    Live Births   6             who was admitted for active phase labor. She received the following interventions: IV Pitocin augmentation She was known to be GBS negative and did not receive antibiotic prophylaxis. The patient progressed well,did not receive an epidural, became complete and started to push. After pushing for 2 x the fetal head was at the perineum, nose and mouth suctioned with bulb suction and the rest of the infant delivered atraumatically, placed on mother abdomen. Cord was clamped and cut and infant handed off to the waiting nurse for evaluation. The delivery of the placenta was spontaneous. The perineum and vagina were explored and no tears or lacerations were noted.

## 2023-10-03 NOTE — PROGRESS NOTES
Assumed care of patient, plan of care discussed at bedside. Pt reports no pain at this time. Call light within reach.

## 2023-10-03 NOTE — PROGRESS NOTES
Assumed care of patient during recovery period. Lochia, fundus and vital signs will be monitored every 15 minutes. Plan of care reviewed with patient, patient verbalizes understanding. Call light within reach.

## 2023-10-04 LAB
HCT VFR BLD AUTO: 32.7 % (ref 34–48)
HGB BLD-MCNC: 10.7 G/DL (ref 11.5–15.5)

## 2023-10-04 PROCEDURE — 85014 HEMATOCRIT: CPT

## 2023-10-04 PROCEDURE — 1220000001 HC SEMI PRIVATE L&D R&B

## 2023-10-04 PROCEDURE — 85018 HEMOGLOBIN: CPT

## 2023-10-04 PROCEDURE — 6370000000 HC RX 637 (ALT 250 FOR IP): Performed by: OBSTETRICS & GYNECOLOGY

## 2023-10-04 RX ADMIN — DOCUSATE SODIUM 100 MG: 100 CAPSULE, LIQUID FILLED ORAL at 21:46

## 2023-10-04 RX ADMIN — DOCUSATE SODIUM 100 MG: 100 CAPSULE, LIQUID FILLED ORAL at 08:36

## 2023-10-04 ASSESSMENT — PAIN SCALES - GENERAL: PAINLEVEL_OUTOF10: 0

## 2023-10-04 NOTE — PLAN OF CARE
Problem: Vaginal Birth or  Section  Goal: Fetal and maternal status remain reassuring during the birth process  Description:  Birth OB-Pregnancy care plan goal which identifies if the fetal and maternal status remain reassuring during the birth process  10/4/2023 0849 by Carissa Cotto RN  Outcome: Progressing     Problem: Postpartum  Goal: Experiences normal postpartum course  Description:  Postpartum OB-Pregnancy care plan goal which identifies if the mother is experiencing a normal postpartum course  10/4/2023 0849 by Carissa Cotto RN  Outcome: Progressing     Problem: Postpartum  Goal: Appropriate maternal -  bonding  Description:  Postpartum OB-Pregnancy care plan goal which identifies if the mother and  are bonding appropriately  10/4/2023 0849 by Carissa Cotto RN  Outcome: Progressing     Problem: Postpartum  Goal: Establishment of infant feeding pattern  Description:  Postpartum OB-Pregnancy care plan goal which identifies if the mother is establishing a feeding pattern with their   10/4/2023 0849 by Carissa Cotto RN  Outcome: Progressing     Problem: Postpartum  Goal: Incisions, wounds, or drain sites healing without S/S of infection  10/4/2023 0849 by Carissa Cotto RN  Outcome: Progressing     Problem: Pain  Goal: Verbalizes/displays adequate comfort level or baseline comfort level  10/4/2023 0849 by Carissa Cotto RN  Outcome: Progressing     Problem: Infection - Adult  Goal: Absence of infection at discharge  10/4/2023 0849 by Carissa Cotto RN  Outcome: Progressing     Problem: Infection - Adult  Goal: Absence of infection during hospitalization  10/4/2023 0849 by Carissa Cotto RN  Outcome: Progressing     Problem: Infection - Adult  Goal: Absence of fever/infection during anticipated neutropenic period  10/4/2023 0849 by Carissa Cotto RN  Outcome: Progressing     Problem: Safety - Adult  Goal: Free from fall injury  10/4/2023 0849 by Yane Mejía Krystal Bolton RN  Outcome: Progressing     Problem: Discharge Planning  Goal: Discharge to home or other facility with appropriate resources  10/4/2023 0849 by Augustin Perea RN  Outcome: Progressing

## 2023-10-04 NOTE — PROGRESS NOTES
Hearing screening results were discussed with parent. Questions answered. Brochure given to parent. Advised to monitor developmental milestones and contact physician for any concerns.    Electronically signed by Kaylin Gaines on 10/4/2023 at 7:51 AM

## 2023-10-05 VITALS
BODY MASS INDEX: 29.82 KG/M2 | DIASTOLIC BLOOD PRESSURE: 56 MMHG | WEIGHT: 190 LBS | TEMPERATURE: 97.7 F | SYSTOLIC BLOOD PRESSURE: 118 MMHG | HEIGHT: 67 IN | OXYGEN SATURATION: 99 % | HEART RATE: 80 BPM | RESPIRATION RATE: 16 BRPM

## 2023-10-05 LAB
CHLAMYDIA DNA UR QL NAA+PROBE: NEGATIVE
N GONORRHOEA DNA UR QL NAA+PROBE: NEGATIVE
SPECIMEN DESCRIPTION: NORMAL

## 2023-10-05 RX ORDER — IBUPROFEN 800 MG/1
800 TABLET ORAL EVERY 8 HOURS PRN
Qty: 120 TABLET | Refills: 0 | Status: SHIPPED | OUTPATIENT
Start: 2023-10-05

## 2023-10-05 NOTE — PROGRESS NOTES
Assumed care of patient. Patient to be d/c'd this morning. Plan of care discussed with patient. Call light in reach.

## 2023-10-05 NOTE — DISCHARGE SUMMARY
Obstetrical Discharge Form         Patients Name  Saumya Serra    Gestational Age:  44w2d    Antepartum complications: post-term    Date of Delivery:   10/3/2023       6:36 AM      Type of Delivery:   Vaginal, Spontaneous [250]   Rupture Date/time:    10/3/2023      5:40 AM     Presentation:    Vertex [1]   Position:                      Anesthesia:    None [250]     Feeding method:         Delivered By:   Damien PAGE     Baby:       Information for the patient's :  Rohit Choudhary [[id number]]        Intrapartum complications: None  Postpartum complications: none  Discharge Date:   10/5/2023  Discharge Condition: Stable  Disposition:   Home    Plan:   Follow up    in 6 weeks

## 2023-10-06 LAB — RPR SER QL: NONREACTIVE

## 2024-01-19 ENCOUNTER — HOSPITAL ENCOUNTER (EMERGENCY)
Age: 29
Discharge: HOME | End: 2024-01-19
Attending: EMERGENCY MEDICINE
Payer: COMMERCIAL

## 2024-01-19 VITALS
DIASTOLIC BLOOD PRESSURE: 80 MMHG | SYSTOLIC BLOOD PRESSURE: 112 MMHG | OXYGEN SATURATION: 98 % | BODY MASS INDEX: 26.31 KG/M2 | TEMPERATURE: 97.5 F | WEIGHT: 168 LBS | HEART RATE: 80 BPM | RESPIRATION RATE: 16 BRPM

## 2024-01-19 DIAGNOSIS — Z32.02 NEGATIVE PREGNANCY TEST: Primary | ICD-10-CM

## 2024-01-19 LAB
BILIRUB UR QL STRIP: NEGATIVE
CLARITY UR: CLEAR
COLOR UR: YELLOW
EPI CELLS #/AREA URNS HPF: ABNORMAL /HPF
GLUCOSE UR STRIP-MCNC: NEGATIVE MG/DL
HCG UR QL: NEGATIVE
HGB UR QL STRIP.AUTO: ABNORMAL
KETONES UR STRIP-MCNC: NEGATIVE MG/DL
LEUKOCYTE ESTERASE UR QL STRIP: NEGATIVE
NITRITE UR QL STRIP: NEGATIVE
PH UR STRIP: 6 [PH] (ref 5–9)
PROT UR STRIP-MCNC: NEGATIVE MG/DL
RBC #/AREA URNS HPF: ABNORMAL /HPF
SP GR UR STRIP: >1.03 (ref 1–1.03)
UROBILINOGEN UR STRIP-ACNC: 0.2 EU/DL (ref 0–1)
WBC #/AREA URNS HPF: ABNORMAL /HPF

## 2024-01-19 PROCEDURE — 81001 URINALYSIS AUTO W/SCOPE: CPT

## 2024-01-19 PROCEDURE — 99283 EMERGENCY DEPT VISIT LOW MDM: CPT

## 2024-01-19 PROCEDURE — 84703 CHORIONIC GONADOTROPIN ASSAY: CPT

## 2024-01-19 ASSESSMENT — PAIN - FUNCTIONAL ASSESSMENT
PAIN_FUNCTIONAL_ASSESSMENT: NONE - DENIES PAIN
PAIN_FUNCTIONAL_ASSESSMENT: NONE - DENIES PAIN

## 2024-01-19 NOTE — ED PROVIDER NOTES
UA 0 TO 5 0 TO 5 /HPF    RBC, UA 0 TO 2 0 TO 2 /HPF    Epithelial Cells UA 0 TO 2 /HPF   Pregnancy, urine   Result Value Ref Range    HCG(Urine) Pregnancy Test NEGATIVE NEGATIVE       RADIOLOGY:  Interpreted by Radiologist.  No orders to display       ------------------------- NURSING NOTES AND VITALS REVIEWED ---------------------------   The nursing notes within the ED encounter and vital signs as below have been reviewed.   /80   Pulse 80   Temp 97.5 °F (36.4 °C)   Resp 16   Wt 76.2 kg (168 lb)   LMP 12/03/2023   SpO2 98%   BMI 26.31 kg/m²   Oxygen Saturation Interpretation: Normal      ---------------------------------------------------PHYSICAL EXAM--------------------------------------      Constitutional/General: Alert and oriented x3, well appearing, non toxic in NAD  Head: NC/AT  Eyes: PERRL, EOMI  Mouth: Oropharynx clear, handling secretions, no trismus  Neck: Supple, full ROM, no meningeal signs  Pulmonary: Lungs clear to auscultation bilaterally, no wheezes, rales, or rhonchi. Not in respiratory distress  Cardiovascular:  Regular rate and rhythm, no murmurs, gallops, or rubs. 2+ distal pulses  Abdomen: Soft, non tender, non distended,   Extremities: Moves all extremities x 4. Warm and well perfused  Skin: warm and dry without rash  Neurologic: GCS 15,  Psych: Normal Affect      ------------------------------ ED COURSE/MEDICAL DECISION MAKING----------------------  Medications - No data to display      Medical Decision Making:    Results explained to the patient.  Follow up with OB/GYN as needed.    Counseling:   The emergency provider has spoken with the patient and discussed today’s results, in addition to providing specific details for the plan of care and counseling regarding the diagnosis and prognosis.  Questions are answered at this time and they are agreeable with the plan.      --------------------------------- IMPRESSION AND DISPOSITION

## 2024-10-16 ENCOUNTER — HOSPITAL ENCOUNTER (EMERGENCY)
Age: 29
Discharge: HOME | End: 2024-10-16
Attending: EMERGENCY MEDICINE
Payer: COMMERCIAL

## 2024-10-16 VITALS
RESPIRATION RATE: 16 BRPM | OXYGEN SATURATION: 100 % | BODY MASS INDEX: 31.95 KG/M2 | SYSTOLIC BLOOD PRESSURE: 112 MMHG | WEIGHT: 204 LBS | DIASTOLIC BLOOD PRESSURE: 74 MMHG | TEMPERATURE: 97.8 F | HEART RATE: 94 BPM

## 2024-10-16 DIAGNOSIS — Z71.1 CONCERN ABOUT STD IN FEMALE WITHOUT DIAGNOSIS: ICD-10-CM

## 2024-10-16 DIAGNOSIS — N89.8 VAGINAL DISCHARGE DURING PREGNANCY, ANTEPARTUM: Primary | ICD-10-CM

## 2024-10-16 DIAGNOSIS — O26.899 VAGINAL DISCHARGE DURING PREGNANCY, ANTEPARTUM: Primary | ICD-10-CM

## 2024-10-16 LAB
BILIRUB UR QL STRIP: NEGATIVE
CLARITY UR: CLEAR
COLOR UR: YELLOW
EPI CELLS #/AREA URNS HPF: ABNORMAL /HPF
GLUCOSE UR STRIP-MCNC: NEGATIVE MG/DL
HGB UR QL STRIP.AUTO: ABNORMAL
KETONES UR STRIP-MCNC: NEGATIVE MG/DL
LEUKOCYTE ESTERASE UR QL STRIP: ABNORMAL
NITRITE UR QL STRIP: NEGATIVE
PH UR STRIP: 8 [PH] (ref 5–9)
PROT UR STRIP-MCNC: NEGATIVE MG/DL
RBC #/AREA URNS HPF: ABNORMAL /HPF
SP GR UR STRIP: 1.02 (ref 1–1.03)
UROBILINOGEN UR STRIP-ACNC: 0.2 EU/DL (ref 0–1)
WBC #/AREA URNS HPF: ABNORMAL /HPF

## 2024-10-16 PROCEDURE — 99283 EMERGENCY DEPT VISIT LOW MDM: CPT

## 2024-10-16 PROCEDURE — 81001 URINALYSIS AUTO W/SCOPE: CPT

## 2024-10-16 PROCEDURE — 87591 N.GONORRHOEAE DNA AMP PROB: CPT

## 2024-10-16 PROCEDURE — 87491 CHLMYD TRACH DNA AMP PROBE: CPT

## 2024-10-16 NOTE — ED PROVIDER NOTES
HPI:  10/16/24,   Time: 10:12 AM EDT         Alejandrina Thorpe is a 29 y.o. female presenting to the ED for chief complaint: Patient presents to facility with vaginal discharge and concerns for STD/currently 28 weeks pregnant and sees Dr. Perez., beginning 3-4 days ago.  The complaint has been persistent, mild in severity, and worsened by nothing.      ROS:   Pertinent positives and negatives are stated within HPI, all other systems reviewed and are negative.  --------------------------------------------- PAST HISTORY ---------------------------------------------  Past Medical History:  has a past medical history of Eczema, Environmental allergies, Missed , and  (spontaneous vaginal delivery).    Past Surgical History:  has a past surgical history that includes Dilation and curettage of uterus (N/A, 10/25/2019).    Social History:  reports that she is a non-smoker but has been exposed to tobacco smoke. She has never used smokeless tobacco. She reports that she does not drink alcohol and does not use drugs.    Family History: family history is not on file.     The patient’s home medications have been reviewed.    Allergies: Patient has no known allergies.    -------------------------------------------------- RESULTS -------------------------------------------------  All laboratory and radiology results have been personally reviewed by myself   LABS:  Results for orders placed or performed during the hospital encounter of 10/16/24   Urinalysis with Microscopic   Result Value Ref Range    Color, UA Yellow Yellow    Turbidity UA Clear Clear    Glucose, Ur NEGATIVE NEGATIVE mg/dL    Bilirubin, Urine NEGATIVE NEGATIVE    Ketones, Urine NEGATIVE NEGATIVE mg/dL    Specific Gravity, UA 1.020 1.005 - 1.030    Urine Hgb TRACE (A) NEGATIVE    pH, Urine 8.0 5.0 - 9.0    Protein, UA NEGATIVE NEGATIVE mg/dL    Urobilinogen, Urine 0.2 0.0 - 1.0 EU/dL    Nitrite, Urine NEGATIVE NEGATIVE    Leukocyte Esterase, Urine

## 2025-01-08 ENCOUNTER — ANESTHESIA EVENT (OUTPATIENT)
Dept: LABOR AND DELIVERY | Age: 30
DRG: 560 | End: 2025-01-08
Payer: COMMERCIAL

## 2025-01-08 ENCOUNTER — HOSPITAL ENCOUNTER (INPATIENT)
Age: 30
LOS: 1 days | Discharge: HOME | DRG: 560 | End: 2025-01-09
Attending: OBSTETRICS & GYNECOLOGY | Admitting: OBSTETRICS & GYNECOLOGY
Payer: COMMERCIAL

## 2025-01-08 ENCOUNTER — APPOINTMENT (OUTPATIENT)
Dept: LABOR AND DELIVERY | Age: 30
DRG: 560 | End: 2025-01-08
Payer: COMMERCIAL

## 2025-01-08 ENCOUNTER — ANESTHESIA (OUTPATIENT)
Dept: LABOR AND DELIVERY | Age: 30
DRG: 560 | End: 2025-01-08
Payer: COMMERCIAL

## 2025-01-08 LAB
ABO + RH BLD: NORMAL
ALBUMIN SERPL-MCNC: 3.6 G/DL (ref 3.5–5.2)
ALP SERPL-CCNC: 167 U/L (ref 35–104)
ALT SERPL-CCNC: 9 U/L (ref 0–32)
AMPHET UR QL SCN: NEGATIVE
ANION GAP SERPL CALCULATED.3IONS-SCNC: 11 MMOL/L (ref 7–16)
ARM BAND NUMBER: NORMAL
AST SERPL-CCNC: 14 U/L (ref 0–31)
BARBITURATES UR QL SCN: NEGATIVE
BASOPHILS # BLD: 0.04 K/UL (ref 0–0.2)
BASOPHILS NFR BLD: 0 % (ref 0–2)
BENZODIAZ UR QL: NEGATIVE
BILIRUB SERPL-MCNC: 0.3 MG/DL (ref 0–1.2)
BLOOD BANK SAMPLE EXPIRATION: NORMAL
BLOOD GROUP ANTIBODIES SERPL: NEGATIVE
BUN SERPL-MCNC: 5 MG/DL (ref 6–20)
BUPRENORPHINE UR QL: NEGATIVE
CALCIUM SERPL-MCNC: 9.5 MG/DL (ref 8.6–10.2)
CANNABINOIDS UR QL SCN: NEGATIVE
CHLORIDE SERPL-SCNC: 103 MMOL/L (ref 98–107)
CO2 SERPL-SCNC: 21 MMOL/L (ref 22–29)
COCAINE UR QL SCN: NEGATIVE
CREAT SERPL-MCNC: 0.5 MG/DL (ref 0.5–1)
EOSINOPHIL # BLD: 0.04 K/UL (ref 0.05–0.5)
EOSINOPHILS RELATIVE PERCENT: 0 % (ref 0–6)
ERYTHROCYTE [DISTWIDTH] IN BLOOD BY AUTOMATED COUNT: 13.2 % (ref 11.5–15)
FENTANYL UR QL: NEGATIVE
GFR, ESTIMATED: >90 ML/MIN/1.73M2
GLUCOSE SERPL-MCNC: 80 MG/DL (ref 74–99)
HCT VFR BLD AUTO: 35.7 % (ref 34–48)
HGB BLD-MCNC: 12 G/DL (ref 11.5–15.5)
IMM GRANULOCYTES # BLD AUTO: 0.05 K/UL (ref 0–0.58)
IMM GRANULOCYTES NFR BLD: 1 % (ref 0–5)
LYMPHOCYTES NFR BLD: 2.13 K/UL (ref 1.5–4)
LYMPHOCYTES RELATIVE PERCENT: 23 % (ref 20–42)
MCH RBC QN AUTO: 27.6 PG (ref 26–35)
MCHC RBC AUTO-ENTMCNC: 33.6 G/DL (ref 32–34.5)
MCV RBC AUTO: 82.3 FL (ref 80–99.9)
METHADONE UR QL: NEGATIVE
MONOCYTES NFR BLD: 0.82 K/UL (ref 0.1–0.95)
MONOCYTES NFR BLD: 9 % (ref 2–12)
NEUTROPHILS NFR BLD: 67 % (ref 43–80)
NEUTS SEG NFR BLD: 6.3 K/UL (ref 1.8–7.3)
OPIATES UR QL SCN: NEGATIVE
OXYCODONE UR QL SCN: NEGATIVE
PCP UR QL SCN: NEGATIVE
PLATELET # BLD AUTO: 336 K/UL (ref 130–450)
PMV BLD AUTO: 10.4 FL (ref 7–12)
POTASSIUM SERPL-SCNC: 4.1 MMOL/L (ref 3.5–5)
PROT SERPL-MCNC: 6.9 G/DL (ref 6.4–8.3)
RBC # BLD AUTO: 4.34 M/UL (ref 3.5–5.5)
SODIUM SERPL-SCNC: 135 MMOL/L (ref 132–146)
TEST INFORMATION: NORMAL
WBC OTHER # BLD: 9.4 K/UL (ref 4.5–11.5)

## 2025-01-08 PROCEDURE — 1220000001 HC SEMI PRIVATE L&D R&B

## 2025-01-08 PROCEDURE — 86900 BLOOD TYPING SEROLOGIC ABO: CPT

## 2025-01-08 PROCEDURE — 7200000001 HC VAGINAL DELIVERY

## 2025-01-08 PROCEDURE — 80053 COMPREHEN METABOLIC PANEL: CPT

## 2025-01-08 PROCEDURE — 3E033VJ INTRODUCTION OF OTHER HORMONE INTO PERIPHERAL VEIN, PERCUTANEOUS APPROACH: ICD-10-PCS | Performed by: OBSTETRICS & GYNECOLOGY

## 2025-01-08 PROCEDURE — 2580000003 HC RX 258: Performed by: ANESTHESIOLOGY

## 2025-01-08 PROCEDURE — 80307 DRUG TEST PRSMV CHEM ANLYZR: CPT

## 2025-01-08 PROCEDURE — 6360000002 HC RX W HCPCS: Performed by: OBSTETRICS & GYNECOLOGY

## 2025-01-08 PROCEDURE — 86901 BLOOD TYPING SEROLOGIC RH(D): CPT

## 2025-01-08 PROCEDURE — 6370000000 HC RX 637 (ALT 250 FOR IP): Performed by: OBSTETRICS & GYNECOLOGY

## 2025-01-08 PROCEDURE — 86592 SYPHILIS TEST NON-TREP QUAL: CPT

## 2025-01-08 PROCEDURE — 85025 COMPLETE CBC W/AUTO DIFF WBC: CPT

## 2025-01-08 PROCEDURE — 86850 RBC ANTIBODY SCREEN: CPT

## 2025-01-08 RX ORDER — MISOPROSTOL 200 UG/1
400 TABLET ORAL PRN
Status: DISCONTINUED | OUTPATIENT
Start: 2025-01-08 | End: 2025-01-09 | Stop reason: HOSPADM

## 2025-01-08 RX ORDER — SODIUM CHLORIDE 0.9 % (FLUSH) 0.9 %
5-40 SYRINGE (ML) INJECTION PRN
Status: DISCONTINUED | OUTPATIENT
Start: 2025-01-08 | End: 2025-01-09 | Stop reason: HOSPADM

## 2025-01-08 RX ORDER — SODIUM CHLORIDE, SODIUM LACTATE, POTASSIUM CHLORIDE, AND CALCIUM CHLORIDE .6; .31; .03; .02 G/100ML; G/100ML; G/100ML; G/100ML
1000 INJECTION, SOLUTION INTRAVENOUS ONCE
Status: DISCONTINUED | OUTPATIENT
Start: 2025-01-08 | End: 2025-01-09 | Stop reason: HOSPADM

## 2025-01-08 RX ORDER — DOCUSATE SODIUM 100 MG/1
100 CAPSULE, LIQUID FILLED ORAL 2 TIMES DAILY
Status: DISCONTINUED | OUTPATIENT
Start: 2025-01-08 | End: 2025-01-09 | Stop reason: HOSPADM

## 2025-01-08 RX ORDER — OXYTOCIN/0.9 % SODIUM CHLORIDE 30/500 ML
10 PLASTIC BAG, INJECTION (ML) INTRAVENOUS PRN
Status: DISCONTINUED | OUTPATIENT
Start: 2025-01-08 | End: 2025-01-09 | Stop reason: HOSPADM

## 2025-01-08 RX ORDER — METHYLERGONOVINE MALEATE 0.2 MG/ML
200 INJECTION INTRAVENOUS PRN
Status: DISCONTINUED | OUTPATIENT
Start: 2025-01-08 | End: 2025-01-09 | Stop reason: HOSPADM

## 2025-01-08 RX ORDER — LIDOCAINE HYDROCHLORIDE 10 MG/ML
INJECTION, SOLUTION INFILTRATION; PERINEURAL
Status: DISPENSED
Start: 2025-01-08 | End: 2025-01-08

## 2025-01-08 RX ORDER — CARBOPROST TROMETHAMINE 250 UG/ML
250 INJECTION, SOLUTION INTRAMUSCULAR PRN
Status: DISCONTINUED | OUTPATIENT
Start: 2025-01-08 | End: 2025-01-09 | Stop reason: HOSPADM

## 2025-01-08 RX ORDER — TRANEXAMIC ACID 10 MG/ML
1000 INJECTION, SOLUTION INTRAVENOUS
Status: ACTIVE | OUTPATIENT
Start: 2025-01-08 | End: 2025-01-09

## 2025-01-08 RX ORDER — SODIUM CHLORIDE 9 MG/ML
25 INJECTION, SOLUTION INTRAVENOUS PRN
Status: DISCONTINUED | OUTPATIENT
Start: 2025-01-08 | End: 2025-01-09 | Stop reason: HOSPADM

## 2025-01-08 RX ORDER — SODIUM CHLORIDE, SODIUM LACTATE, POTASSIUM CHLORIDE, AND CALCIUM CHLORIDE .6; .31; .03; .02 G/100ML; G/100ML; G/100ML; G/100ML
1000 INJECTION, SOLUTION INTRAVENOUS ONCE
Status: COMPLETED | OUTPATIENT
Start: 2025-01-08 | End: 2025-01-09

## 2025-01-08 RX ORDER — SODIUM CHLORIDE, SODIUM LACTATE, POTASSIUM CHLORIDE, AND CALCIUM CHLORIDE .6; .31; .03; .02 G/100ML; G/100ML; G/100ML; G/100ML
500 INJECTION, SOLUTION INTRAVENOUS PRN
Status: DISCONTINUED | OUTPATIENT
Start: 2025-01-08 | End: 2025-01-09 | Stop reason: HOSPADM

## 2025-01-08 RX ORDER — SODIUM CHLORIDE 0.9 % (FLUSH) 0.9 %
5-40 SYRINGE (ML) INJECTION EVERY 12 HOURS SCHEDULED
Status: DISCONTINUED | OUTPATIENT
Start: 2025-01-08 | End: 2025-01-09 | Stop reason: HOSPADM

## 2025-01-08 RX ORDER — ONDANSETRON 2 MG/ML
4 INJECTION INTRAMUSCULAR; INTRAVENOUS EVERY 6 HOURS PRN
Status: DISCONTINUED | OUTPATIENT
Start: 2025-01-08 | End: 2025-01-09 | Stop reason: HOSPADM

## 2025-01-08 RX ORDER — TERBUTALINE SULFATE 1 MG/ML
0.25 INJECTION, SOLUTION SUBCUTANEOUS
Status: ACTIVE | OUTPATIENT
Start: 2025-01-08 | End: 2025-01-09

## 2025-01-08 RX ORDER — SODIUM CHLORIDE, SODIUM LACTATE, POTASSIUM CHLORIDE, CALCIUM CHLORIDE 600; 310; 30; 20 MG/100ML; MG/100ML; MG/100ML; MG/100ML
INJECTION, SOLUTION INTRAVENOUS CONTINUOUS
Status: DISCONTINUED | OUTPATIENT
Start: 2025-01-08 | End: 2025-01-09 | Stop reason: HOSPADM

## 2025-01-08 RX ORDER — IBUPROFEN 800 MG/1
800 TABLET, FILM COATED ORAL EVERY 8 HOURS PRN
Status: DISCONTINUED | OUTPATIENT
Start: 2025-01-08 | End: 2025-01-09 | Stop reason: HOSPADM

## 2025-01-08 RX ORDER — ACETAMINOPHEN 500 MG
1000 TABLET ORAL EVERY 8 HOURS PRN
Status: DISCONTINUED | OUTPATIENT
Start: 2025-01-08 | End: 2025-01-09 | Stop reason: HOSPADM

## 2025-01-08 RX ORDER — ONDANSETRON 4 MG/1
4 TABLET, ORALLY DISINTEGRATING ORAL EVERY 6 HOURS PRN
Status: DISCONTINUED | OUTPATIENT
Start: 2025-01-08 | End: 2025-01-09 | Stop reason: HOSPADM

## 2025-01-08 RX ADMIN — Medication 10 UNITS: at 11:10

## 2025-01-08 RX ADMIN — Medication 87.3 MILLI-UNITS/MIN: at 12:11

## 2025-01-08 RX ADMIN — ACETAMINOPHEN 1000 MG: 500 TABLET ORAL at 12:44

## 2025-01-08 RX ADMIN — SODIUM CHLORIDE, POTASSIUM CHLORIDE, SODIUM LACTATE AND CALCIUM CHLORIDE 125 ML: 600; 310; 30; 20 INJECTION, SOLUTION INTRAVENOUS at 07:43

## 2025-01-08 RX ADMIN — Medication 2 MILLI-UNITS/MIN: at 08:00

## 2025-01-08 RX ADMIN — DOCUSATE SODIUM 100 MG: 100 CAPSULE, LIQUID FILLED ORAL at 20:54

## 2025-01-08 RX ADMIN — IBUPROFEN 800 MG: 800 TABLET, FILM COATED ORAL at 13:36

## 2025-01-08 ASSESSMENT — PAIN DESCRIPTION - LOCATION
LOCATION: ABDOMEN
LOCATION: ABDOMEN

## 2025-01-08 ASSESSMENT — PAIN SCALES - GENERAL
PAINLEVEL_OUTOF10: 0
PAINLEVEL_OUTOF10: 7
PAINLEVEL_OUTOF10: 7

## 2025-01-08 ASSESSMENT — PAIN DESCRIPTION - ORIENTATION
ORIENTATION: LOWER
ORIENTATION: LOWER

## 2025-01-08 ASSESSMENT — PAIN DESCRIPTION - DESCRIPTORS
DESCRIPTORS: ACHING;CRAMPING
DESCRIPTORS: ACHING;CRAMPING

## 2025-01-08 ASSESSMENT — PAIN - FUNCTIONAL ASSESSMENT
PAIN_FUNCTIONAL_ASSESSMENT: ACTIVITIES ARE NOT PREVENTED
PAIN_FUNCTIONAL_ASSESSMENT: ACTIVITIES ARE NOT PREVENTED

## 2025-01-08 NOTE — ANESTHESIA PRE PROCEDURE
Department of Anesthesiology  Preprocedure Note       Name:  Alejandrina Thorpe   Age:  29 y.o.  :  1995                                          MRN:  [id number]         Date:  2025      Surgeon: * No surgeons listed *    Procedure: Labor Analgesia    Medications prior to admission:   Prior to Admission medications    Not on File       Current medications:    Current Facility-Administered Medications   Medication Dose Route Frequency Provider Last Rate Last Admin   • lactated ringers bolus 500 mL  500 mL IntraVENous PRN Srinivasan Perez MD        Or   • lactated ringers bolus 500 mL  500 mL IntraVENous PRN Srinivasan Perez MD       • sodium chloride flush 0.9 % injection 5-40 mL  5-40 mL IntraVENous 2 times per day Srinivasan Perez MD       • sodium chloride flush 0.9 % injection 5-40 mL  5-40 mL IntraVENous PRN Srinivasan Perez MD       • 0.9 % sodium chloride infusion  25 mL IntraVENous PRN Srinivasan Perez MD       • terbutaline (BRETHINE) injection 0.25 mg  0.25 mg SubCUTAneous Once PRN Srinivasan Perez MD       • oxytocin (PITOCIN) 30 units in 500 mL infusion  1-20 charmaine-units/min IntraVENous Continuous Srinivasan Perez MD       • fentaNYL 1.85mcg/ml and BUPivacaine 0.1% 15ml syringe (Home Care) (OB) 15 mL epidural  15 mL Epidural Once Garry Lainezqueline, DO       • fentaNYL 1.85mcg/ml and Bupivicaine 0.1% in 0.9% NS 135ml infusion (OB) epidural  15 mL/hr Epidural Continuous Garry Lainezqueline, DO       • lactated ringers bolus 1,000 mL  1,000 mL IntraVENous Once Migel, Lety, DO       • lactated ringers bolus 1,000 mL  1,000 mL IntraVENous Once Migel, Lety, DO           Allergies:  No Known Allergies    Problem List:    Patient Active Problem List   Diagnosis Code   • Encounter for elective induction of labor Z34.90   • Missed  O02.1   • Pyelonephritis N12   • UTI (urinary tract infection) in pregnancy in second trimester O23.42   • 40 weeks gestation of pregnancy Z3A.40

## 2025-01-08 NOTE — H&P
ASSESSMENT AND PLAN:full term pregnancy, GBS negative induction of labor, grand multipara, poor prenatal care noncompliance with prenatal visits  Principal Problem:    40 weeks gestation of pregnancy  Resolved Problems:    * No resolved hospital problems. *      Labor: OBSERVATION, anticipate normal delivery, routine labor orders  Fetus: Reassuring  GBS: Negative  IVFluids,labs,analgesics/epidural as needed  Pitocin is started for induction of labor as per protocol and reassess periodically        Electronically signed by Srinivasan Perez MD on 1/8/2025 at 11:29 AM

## 2025-01-09 VITALS
WEIGHT: 215 LBS | SYSTOLIC BLOOD PRESSURE: 103 MMHG | TEMPERATURE: 98.2 F | DIASTOLIC BLOOD PRESSURE: 50 MMHG | OXYGEN SATURATION: 100 % | HEIGHT: 67 IN | RESPIRATION RATE: 18 BRPM | BODY MASS INDEX: 33.74 KG/M2 | HEART RATE: 83 BPM

## 2025-01-09 LAB
BASOPHILS # BLD: 0.05 K/UL (ref 0–0.2)
BASOPHILS NFR BLD: 0 % (ref 0–2)
EOSINOPHIL # BLD: 0.06 K/UL (ref 0.05–0.5)
EOSINOPHILS RELATIVE PERCENT: 1 % (ref 0–6)
ERYTHROCYTE [DISTWIDTH] IN BLOOD BY AUTOMATED COUNT: 13.1 % (ref 11.5–15)
HCT VFR BLD AUTO: 32.1 % (ref 34–48)
HGB BLD-MCNC: 10.9 G/DL (ref 11.5–15.5)
IMM GRANULOCYTES # BLD AUTO: 0.05 K/UL (ref 0–0.58)
IMM GRANULOCYTES NFR BLD: 0 % (ref 0–5)
LYMPHOCYTES NFR BLD: 3.09 K/UL (ref 1.5–4)
LYMPHOCYTES RELATIVE PERCENT: 25 % (ref 20–42)
MCH RBC QN AUTO: 27.9 PG (ref 26–35)
MCHC RBC AUTO-ENTMCNC: 34 G/DL (ref 32–34.5)
MCV RBC AUTO: 82.1 FL (ref 80–99.9)
MONOCYTES NFR BLD: 0.96 K/UL (ref 0.1–0.95)
MONOCYTES NFR BLD: 8 % (ref 2–12)
NEUTROPHILS NFR BLD: 65 % (ref 43–80)
NEUTS SEG NFR BLD: 7.94 K/UL (ref 1.8–7.3)
PLATELET # BLD AUTO: 312 K/UL (ref 130–450)
PMV BLD AUTO: 10.2 FL (ref 7–12)
RBC # BLD AUTO: 3.91 M/UL (ref 3.5–5.5)
RPR SER QL: NONREACTIVE
WBC OTHER # BLD: 12.2 K/UL (ref 4.5–11.5)

## 2025-01-09 PROCEDURE — 85025 COMPLETE CBC W/AUTO DIFF WBC: CPT

## 2025-01-09 PROCEDURE — 6370000000 HC RX 637 (ALT 250 FOR IP): Performed by: OBSTETRICS & GYNECOLOGY

## 2025-01-09 RX ORDER — IBUPROFEN 600 MG/1
600 TABLET, FILM COATED ORAL 3 TIMES DAILY PRN
Qty: 30 TABLET | Refills: 0 | Status: SHIPPED | OUTPATIENT
Start: 2025-01-09

## 2025-01-09 RX ORDER — FERROUS SULFATE 325(65) MG
325 TABLET ORAL 2 TIMES DAILY
Qty: 180 TABLET | Refills: 1 | Status: SHIPPED | OUTPATIENT
Start: 2025-01-09

## 2025-01-09 RX ADMIN — ACETAMINOPHEN 1000 MG: 500 TABLET ORAL at 02:15

## 2025-01-09 ASSESSMENT — PAIN DESCRIPTION - ORIENTATION: ORIENTATION: LOWER

## 2025-01-09 ASSESSMENT — PAIN SCALES - GENERAL: PAINLEVEL_OUTOF10: 6

## 2025-01-09 ASSESSMENT — PAIN DESCRIPTION - DESCRIPTORS: DESCRIPTORS: CRAMPING

## 2025-01-09 ASSESSMENT — PAIN DESCRIPTION - LOCATION: LOCATION: ABDOMEN

## 2025-01-09 NOTE — PLAN OF CARE
Problem: Vaginal Birth or  Section  Goal: Fetal and maternal status remain reassuring during the birth process  Description:  Birth OB-Pregnancy care plan goal which identifies if the fetal and maternal status remain reassuring during the birth process  2025 by Rosey Rudd RN  Outcome: Progressing  2025 by Ashlee Garduno RN  Outcome: Progressing     Problem: Postpartum  Goal: Experiences normal postpartum course  Description:  Postpartum OB-Pregnancy care plan goal which identifies if the mother is experiencing a normal postpartum course  2025 by Rosey Rudd RN  Outcome: Progressing  2025 by Ashlee Garduno RN  Outcome: Progressing  Goal: Appropriate maternal -  bonding  Description:  Postpartum OB-Pregnancy care plan goal which identifies if the mother and  are bonding appropriately  2025 by Rosey Rudd RN  Outcome: Progressing  2025 by Ashlee Garduno RN  Outcome: Progressing  Goal: Establishment of infant feeding pattern  Description:  Postpartum OB-Pregnancy care plan goal which identifies if the mother is establishing a feeding pattern with their   2025 by Rosey Rudd RN  Outcome: Progressing  2025 by Ashlee Garduno RN  Outcome: Progressing  Goal: Incisions, wounds, or drain sites healing without S/S of infection  2025 by Rosey Rudd RN  Outcome: Progressing  2025 by Ashlee Garduno RN  Outcome: Progressing  Flowsheets (Taken 2025)  Incisions, Wounds, or Drain Sites Healing Without Sign and Symptoms of Infection: ADMISSION and DAILY: Assess and document risk factors for pressure ulcer development     Problem: Pain  Goal: Verbalizes/displays adequate comfort level or baseline comfort level  2025 by Rosey Rudd RN  Outcome: Progressing  2025 by Ashlee Garduno RN  Outcome: Progressing

## 2025-01-09 NOTE — DISCHARGE SUMMARY
tablet  ibuprofen 600 MG tablet          Follow-up with Srinivasan Perez MD, M.D. in 4 weeks.    Signed:  Srinivasan Perez MD,MLONG.  1/9/2025  11:38 AM

## 2025-01-09 NOTE — PROGRESS NOTES
Assumed care of patient for 7 am - 7 pm shift. Plan of care discussed and agreed upon. Assessment completed and charted. All needs addressed. Call light with in reach. Bed wheels locked.  
Assumed care of patient for [id number] plan of care for night discussed and rest encouraged. Call light with in reach   
CLINICAL PHARMACY NOTE: MEDS TO BEDS    Total # of Prescriptions Filled: 2   The following medications were delivered to the patient:  Ibuprofen 600 mg  Ferosul 325 mg    Additional Documentation:   
DR Perez called to come to the hospital for delivery  
DR Perez here 1050-vag exam -pt pushing w/ the pressure.male delivered at 1056,nuchal x1-tight-strong cry.  
DR Perez updated -pitocin to be turned off  
Discharged ambulatory to the car with baby.  
Dr gonzalez at bedside, discusses plan of care discussed  
Mom is breastfeeding the baby well.  
Patient had SROM fluid is yellow -pt up to void  
Patient is here for an induction-dr Preez called with update on pt's info-orders received.Oriented to room 4.  
Patient up -voided and showered-tolerated activity well.  
Patient up and voided lg amt of urine-tolerated activity well  
Pitocin started at 2 mu-reviewed plan of care with pt-she verbalizes good understanding.RN reviewing fhr and contractions every 15 min while on pitocin.  
Pt is stable waiting for the placenta to be delivered.  
Subjective:     Postpartum Day 1  The patient feels well. The patient has no emotional concerns. Pain is well controlled with current medications. The baby iswell. Baby is feeding via both breast and bottle - Similac with low iron. Urinary output is adequate. The patient is ambulating well. The patient is tolerating a normal diet. Flatus has been passed.    Objective:        BP (!) 103/50   Pulse 83   Temp 98.2 °F (36.8 °C) (Oral)   Resp 18   Ht 1.702 m (5' 7\")   Wt 97.5 kg (215 lb)   LMP 12/03/2023   SpO2 100%   Breastfeeding Unknown   BMI 33.67 kg/m²   No intake/output data recorded.         General:    alert, appears stated age, and cooperative   Lungs:    Lochia:  appropriate   Uterine    firmnontender   Back         no pain to palpation   DVT Evaluation:  No evidence of DVT seen on physical exam.  Negative Irma's sign.     Recent Results (from the past 72 hour(s))   CBC auto differential    Collection Time: 01/08/25  7:15 AM   Result Value Ref Range    WBC 9.4 4.5 - 11.5 k/uL    RBC 4.34 3.50 - 5.50 m/uL    Hemoglobin 12.0 11.5 - 15.5 g/dL    Hematocrit 35.7 34.0 - 48.0 %    MCV 82.3 80.0 - 99.9 fL    MCH 27.6 26.0 - 35.0 pg    MCHC 33.6 32.0 - 34.5 g/dL    RDW 13.2 11.5 - 15.0 %    Platelets 336 130 - 450 k/uL    MPV 10.4 7.0 - 12.0 fL    Neutrophils % 67 43.0 - 80.0 %    Lymphocytes % 23 20.0 - 42.0 %    Monocytes % 9 2.0 - 12.0 %    Eosinophils % 0 0 - 6 %    Basophils % 0 0.0 - 2.0 %    Immature Granulocytes % 1 0.0 - 5.0 %    Neutrophils Absolute 6.30 1.80 - 7.30 k/uL    Lymphocytes Absolute 2.13 1.50 - 4.00 k/uL    Monocytes Absolute 0.82 0.10 - 0.95 k/uL    Eosinophils Absolute 0.04 (L) 0.05 - 0.50 k/uL    Basophils Absolute 0.04 0.00 - 0.20 k/uL    Immature Granulocytes Absolute 0.05 0.00 - 0.58 k/uL   Urine Drug Screen    Collection Time: 01/08/25  7:30 AM   Result Value Ref Range    Amphetamine Screen, Ur NEGATIVE NEGATIVE    Barbiturate Screen, Ur NEGATIVE NEGATIVE    Benzodiazepine Screen, 
[employer] [place] [facility] screening results were discussed with parent. Questions answered. Brochure given to parent. Advised to monitor developmental milestones and contact physician for any concerns.       Jeremiah Pritchard CCC/GUANAKO  Audiologist  [id number]  NPI#:  [id number]    
Urine NEGATIVE NEGATIVE    Cocaine Metabolite, Urine NEGATIVE NEGATIVE    Methadone Screen, Urine NEGATIVE NEGATIVE    Opiates, Urine NEGATIVE NEGATIVE    Phencyclidine, Urine NEGATIVE NEGATIVE    Cannabinoid Scrn, Ur NEGATIVE NEGATIVE    Oxycodone Screen, Ur NEGATIVE NEGATIVE    Fentanyl, Ur NEGATIVE NEGATIVE    Buprenorphine Urine NEGATIVE NEGATIVE    Test Information       These drug screen results are for medical purposes only and should not be considered definitive or confirmed.   TYPE AND SCREEN    Collection Time: 01/08/25  7:30 AM   Result Value Ref Range    Blood Bank Sample Expiration 01/11/2025,2359     Arm Band Number [id number]     ABO/Rh B POSITIVE     Antibody Screen NEGATIVE    Comprehensive metabolic panel    Collection Time: 01/08/25  7:30 AM   Result Value Ref Range    Sodium 135 132 - 146 mmol/L    Potassium 4.1 3.5 - 5.0 mmol/L    Chloride 103 98 - 107 mmol/L    CO2 21 (L) 22 - 29 mmol/L    Anion Gap 11 7 - 16 mmol/L    Glucose 80 74 - 99 mg/dL    BUN 5 (L) 6 - 20 mg/dL    Creatinine 0.5 0.50 - 1.00 mg/dL    Est, Glom Filt Rate >90 >60 mL/min/1.73m2    Calcium 9.5 8.6 - 10.2 mg/dL    Total Protein 6.9 6.4 - 8.3 g/dL    Albumin 3.6 3.5 - 5.2 g/dL    Total Bilirubin 0.3 0.0 - 1.2 mg/dL    Alkaline Phosphatase 167 (H) 35 - 104 U/L    ALT 9 0 - 32 U/L    AST 14 0 - 31 U/L   CBC auto differential    Collection Time: 01/09/25  5:10 AM   Result Value Ref Range    WBC 12.2 (H) 4.5 - 11.5 k/uL    RBC 3.91 3.50 - 5.50 m/uL    Hemoglobin 10.9 (L) 11.5 - 15.5 g/dL    Hematocrit 32.1 (L) 34.0 - 48.0 %    MCV 82.1 80.0 - 99.9 fL    MCH 27.9 26.0 - 35.0 pg    MCHC 34.0 32.0 - 34.5 g/dL    RDW 13.1 11.5 - 15.0 %    Platelets 312 130 - 450 k/uL    MPV 10.2 7.0 - 12.0 fL    Neutrophils % 65 43.0 - 80.0 %    Lymphocytes % 25 20.0 - 42.0 %    Monocytes % 8 2.0 - 12.0 %    Eosinophils % 1 0 - 6 %    Basophils % 0 0.0 - 2.0 %    Immature Granulocytes % 0 0.0 - 5.0 %    Neutrophils Absolute 7.94 (H) 1.80 - 7.30 k/uL

## (undated) DEVICE — DOUBLE BASIN SET: Brand: MEDLINE INDUSTRIES, INC.

## (undated) DEVICE — COVER,LIGHT HANDLE,FLX,1/PK: Brand: MEDLINE INDUSTRIES, INC.

## (undated) DEVICE — CATHETER,URETHRAL,VINYL,MALE,16",16 FR: Brand: MEDLINE

## (undated) DEVICE — TRAY PROCED DILATATION CURETTAGE

## (undated) DEVICE — TOWEL,OR,DSP,ST,BLUE,STD,6/PK,12PK/CS: Brand: MEDLINE

## (undated) DEVICE — TRAY,VAG PREP,2PR VNYL GLV,4 C: Brand: MEDLINE INDUSTRIES, INC.

## (undated) DEVICE — GAUZE,SPONGE,4"X4",16PLY,XRAY,STRL,LF: Brand: MEDLINE

## (undated) DEVICE — GOWN,SIRUS,NONRNF,SETINSLV,XL,20/CS: Brand: MEDLINE

## (undated) DEVICE — PAD MATERNITY CURITY ADH STRIP DISP

## (undated) DEVICE — GLOVE ORANGE PI 7 1/2   MSG9075

## (undated) DEVICE — PACK PROC 3IN1 W/ L12FT DIA0.25IN REINF SUCT TBNG W50XL901IN

## (undated) DEVICE — MARKER,SKIN,WI/RULER AND LABELS: Brand: MEDLINE

## (undated) DEVICE — SMARTGOWN BREATHABLE SURGICAL GOWN: Brand: CONVERTORS